# Patient Record
Sex: FEMALE | Race: WHITE | Employment: FULL TIME | URBAN - METROPOLITAN AREA
[De-identification: names, ages, dates, MRNs, and addresses within clinical notes are randomized per-mention and may not be internally consistent; named-entity substitution may affect disease eponyms.]

---

## 2017-01-10 RX ORDER — MONTELUKAST SODIUM 10 MG/1
TABLET ORAL
Qty: 90 TABLET | Refills: 0 | Status: SHIPPED | OUTPATIENT
Start: 2017-01-10 | End: 2017-04-03

## 2017-01-16 ENCOUNTER — TELEPHONE (OUTPATIENT)
Dept: FAMILY MEDICINE CLINIC | Facility: CLINIC | Age: 52
End: 2017-01-16

## 2017-01-16 DIAGNOSIS — E89.0 POSTABLATIVE HYPOTHYROIDISM: ICD-10-CM

## 2017-01-16 DIAGNOSIS — Z13.0 SCREENING FOR IRON DEFICIENCY ANEMIA: ICD-10-CM

## 2017-01-16 DIAGNOSIS — Z12.31 ENCOUNTER FOR SCREENING MAMMOGRAM FOR MALIGNANT NEOPLASM OF BREAST: ICD-10-CM

## 2017-01-16 DIAGNOSIS — Z13.220 SCREENING FOR LIPOID DISORDERS: ICD-10-CM

## 2017-01-16 DIAGNOSIS — Z13.29 SCREENING FOR ENDOCRINE, NUTRITIONAL, METABOLIC AND IMMUNITY DISORDER: ICD-10-CM

## 2017-01-16 DIAGNOSIS — Z13.21 ENCOUNTER FOR VITAMIN DEFICIENCY SCREENING: ICD-10-CM

## 2017-01-16 DIAGNOSIS — Z13.228 SCREENING FOR ENDOCRINE, NUTRITIONAL, METABOLIC AND IMMUNITY DISORDER: ICD-10-CM

## 2017-01-16 DIAGNOSIS — Z00.00 ROUTINE GENERAL MEDICAL EXAMINATION AT A HEALTH CARE FACILITY: Primary | ICD-10-CM

## 2017-01-16 DIAGNOSIS — Z13.0 SCREENING FOR ENDOCRINE, NUTRITIONAL, METABOLIC AND IMMUNITY DISORDER: ICD-10-CM

## 2017-01-16 DIAGNOSIS — Z13.21 SCREENING FOR ENDOCRINE, NUTRITIONAL, METABOLIC AND IMMUNITY DISORDER: ICD-10-CM

## 2017-01-16 NOTE — TELEPHONE ENCOUNTER
Called patient and spoke with her. Patient is requesting an order for all of her annual blood work prior to her appointment. Patient is also requesting a mammogram order. Patient would like to complete mammogram at [facility].   Advised patient she can

## 2017-01-31 ENCOUNTER — HOSPITAL ENCOUNTER (OUTPATIENT)
Dept: MAMMOGRAPHY | Age: 52
Discharge: HOME | End: 2017-01-31
Attending: FAMILY MEDICINE
Payer: COMMERCIAL

## 2017-01-31 ENCOUNTER — LAB ENCOUNTER (OUTPATIENT)
Dept: LAB | Age: 52
End: 2017-01-31
Attending: FAMILY MEDICINE
Payer: COMMERCIAL

## 2017-01-31 DIAGNOSIS — Z13.0 SCREENING FOR ENDOCRINE, NUTRITIONAL, METABOLIC AND IMMUNITY DISORDER: ICD-10-CM

## 2017-01-31 DIAGNOSIS — Z13.0 SCREENING FOR IRON DEFICIENCY ANEMIA: ICD-10-CM

## 2017-01-31 DIAGNOSIS — Z13.228 SCREENING FOR ENDOCRINE, NUTRITIONAL, METABOLIC AND IMMUNITY DISORDER: ICD-10-CM

## 2017-01-31 DIAGNOSIS — E89.0 POSTABLATIVE HYPOTHYROIDISM: ICD-10-CM

## 2017-01-31 DIAGNOSIS — Z13.220 SCREENING FOR LIPOID DISORDERS: ICD-10-CM

## 2017-01-31 DIAGNOSIS — Z12.31 ENCOUNTER FOR SCREENING MAMMOGRAM FOR MALIGNANT NEOPLASM OF BREAST: ICD-10-CM

## 2017-01-31 DIAGNOSIS — Z13.29 SCREENING FOR ENDOCRINE, NUTRITIONAL, METABOLIC AND IMMUNITY DISORDER: ICD-10-CM

## 2017-01-31 DIAGNOSIS — Z00.00 ROUTINE GENERAL MEDICAL EXAMINATION AT A HEALTH CARE FACILITY: ICD-10-CM

## 2017-01-31 DIAGNOSIS — Z13.21 SCREENING FOR ENDOCRINE, NUTRITIONAL, METABOLIC AND IMMUNITY DISORDER: ICD-10-CM

## 2017-01-31 DIAGNOSIS — Z13.21 ENCOUNTER FOR VITAMIN DEFICIENCY SCREENING: ICD-10-CM

## 2017-01-31 LAB
25-HYDROXYVITAMIN D (TOTAL): 38 NG/ML (ref 30–100)
ALBUMIN SERPL-MCNC: 3.8 G/DL (ref 3.5–4.8)
ALP LIVER SERPL-CCNC: 62 U/L (ref 39–100)
ALT SERPL-CCNC: 24 U/L (ref 14–54)
AST SERPL-CCNC: 11 U/L (ref 15–41)
BASOPHILS # BLD AUTO: 0.06 X10(3) UL (ref 0–0.1)
BASOPHILS NFR BLD AUTO: 1 %
BILIRUB SERPL-MCNC: 0.4 MG/DL (ref 0.1–2)
BUN BLD-MCNC: 9 MG/DL (ref 8–20)
CALCIUM BLD-MCNC: 8.4 MG/DL (ref 8.3–10.3)
CHLORIDE: 107 MMOL/L (ref 101–111)
CHOLEST SMN-MCNC: 187 MG/DL (ref ?–200)
CO2: 26 MMOL/L (ref 22–32)
CREAT BLD-MCNC: 0.8 MG/DL (ref 0.55–1.02)
EOSINOPHIL # BLD AUTO: 0.11 X10(3) UL (ref 0–0.3)
EOSINOPHIL NFR BLD AUTO: 1.9 %
ERYTHROCYTE [DISTWIDTH] IN BLOOD BY AUTOMATED COUNT: 11.9 % (ref 11.5–16)
FREE T4: 1.4 NG/DL (ref 0.9–1.8)
GLUCOSE BLD-MCNC: 89 MG/DL (ref 70–99)
HCT VFR BLD AUTO: 38.1 % (ref 34–50)
HDLC SERPL-MCNC: 82 MG/DL (ref 45–?)
HDLC SERPL: 2.28 {RATIO} (ref ?–4.44)
HGB BLD-MCNC: 12.8 G/DL (ref 12–16)
IMMATURE GRANULOCYTE COUNT: 0.01 X10(3) UL (ref 0–1)
IMMATURE GRANULOCYTE RATIO %: 0.2 %
LDLC SERPL CALC-MCNC: 92 MG/DL (ref ?–130)
LYMPHOCYTES # BLD AUTO: 1.44 X10(3) UL (ref 0.9–4)
LYMPHOCYTES NFR BLD AUTO: 24.4 %
M PROTEIN MFR SERPL ELPH: 7.2 G/DL (ref 6.1–8.3)
MCH RBC QN AUTO: 30 PG (ref 27–33.2)
MCHC RBC AUTO-ENTMCNC: 33.6 G/DL (ref 31–37)
MCV RBC AUTO: 89.4 FL (ref 81–100)
MONOCYTES # BLD AUTO: 0.39 X10(3) UL (ref 0.1–0.6)
MONOCYTES NFR BLD AUTO: 6.6 %
NEUTROPHIL ABS PRELIM: 3.9 X10 (3) UL (ref 1.3–6.7)
NEUTROPHILS # BLD AUTO: 3.9 X10(3) UL (ref 1.3–6.7)
NEUTROPHILS NFR BLD AUTO: 65.9 %
NONHDLC SERPL-MCNC: 105 MG/DL (ref ?–130)
PLATELET # BLD AUTO: 191 10(3)UL (ref 150–450)
POTASSIUM SERPL-SCNC: 4.2 MMOL/L (ref 3.6–5.1)
RBC # BLD AUTO: 4.26 X10(6)UL (ref 3.8–5.1)
RED CELL DISTRIBUTION WIDTH-SD: 38.3 FL (ref 35.1–46.3)
SODIUM SERPL-SCNC: 139 MMOL/L (ref 136–144)
TRIGLYCERIDES: 63 MG/DL (ref ?–150)
TSI SER-ACNC: 0.14 MIU/ML (ref 0.35–5.5)
VLDL: 13 MG/DL (ref 5–40)
WBC # BLD AUTO: 5.9 X10(3) UL (ref 4–13)

## 2017-01-31 PROCEDURE — 36415 COLL VENOUS BLD VENIPUNCTURE: CPT

## 2017-01-31 PROCEDURE — 85025 COMPLETE CBC W/AUTO DIFF WBC: CPT

## 2017-01-31 PROCEDURE — 82306 VITAMIN D 25 HYDROXY: CPT

## 2017-01-31 PROCEDURE — 80053 COMPREHEN METABOLIC PANEL: CPT

## 2017-01-31 PROCEDURE — 84443 ASSAY THYROID STIM HORMONE: CPT

## 2017-01-31 PROCEDURE — 77067 SCR MAMMO BI INCL CAD: CPT

## 2017-01-31 PROCEDURE — 80061 LIPID PANEL: CPT

## 2017-01-31 PROCEDURE — 84439 ASSAY OF FREE THYROXINE: CPT

## 2017-02-03 DIAGNOSIS — E89.0 POSTABLATIVE HYPOTHYROIDISM: Primary | ICD-10-CM

## 2017-02-03 DIAGNOSIS — R79.89 LOW VITAMIN D LEVEL: ICD-10-CM

## 2017-02-06 PROBLEM — Z98.84 HISTORY OF ADJUSTABLE GASTRIC BANDING: Status: ACTIVE | Noted: 2017-02-06

## 2017-02-08 ENCOUNTER — OFFICE VISIT (OUTPATIENT)
Dept: FAMILY MEDICINE CLINIC | Facility: CLINIC | Age: 52
End: 2017-02-08

## 2017-02-08 VITALS
HEART RATE: 74 BPM | RESPIRATION RATE: 16 BRPM | SYSTOLIC BLOOD PRESSURE: 100 MMHG | BODY MASS INDEX: 32.44 KG/M2 | DIASTOLIC BLOOD PRESSURE: 70 MMHG | HEIGHT: 64 IN | WEIGHT: 190 LBS

## 2017-02-08 DIAGNOSIS — E55.9 VITAMIN D DEFICIENCY: ICD-10-CM

## 2017-02-08 DIAGNOSIS — Z98.84 HISTORY OF ADJUSTABLE GASTRIC BANDING: ICD-10-CM

## 2017-02-08 DIAGNOSIS — Z00.00 ANNUAL PHYSICAL EXAM: Primary | ICD-10-CM

## 2017-02-08 DIAGNOSIS — J30.89 PERENNIAL ALLERGIC RHINITIS, UNSPECIFIED ALLERGIC RHINITIS TRIGGER: ICD-10-CM

## 2017-02-08 DIAGNOSIS — E89.0 POSTABLATIVE HYPOTHYROIDISM: ICD-10-CM

## 2017-02-08 PROCEDURE — 99396 PREV VISIT EST AGE 40-64: CPT | Performed by: FAMILY MEDICINE

## 2017-02-08 RX ORDER — ERGOCALCIFEROL 1.25 MG/1
50000 CAPSULE ORAL WEEKLY
Qty: 4 CAPSULE | Refills: 2 | Status: SHIPPED | OUTPATIENT
Start: 2017-02-08 | End: 2017-03-10

## 2017-02-08 NOTE — PROGRESS NOTES
Chief Complaint:   Patient presents with:  Physical    HPI:   This is a 54year old female patient's presenting for physical and for follow up after she was diagnosed with grave's and is currently being treated with Endocrinology.     Patient tolerated radi Negative for congestion, ear pain, facial swelling, postnasal drip, rhinorrhea, sinus pressure, sore throat and voice change. Eyes: Negative for photophobia, pain, discharge, redness, itching and visual disturbance.    Respiratory: Negative for cough, ch present  Nose: Nose normal.   Mouth/Throat: Oropharynx is clear and moist.   Eyes: Conjunctivae are normal. Pupils are equal, round, and reactive to light. Right eye exhibits no discharge. Left eye exhibits no discharge. No scleral icterus.    Neck: Normal -will do supplements. Follow up in 6-12 months, sooner as needed. Pap in 2018.

## 2017-02-10 ENCOUNTER — HOSPITAL ENCOUNTER (OUTPATIENT)
Dept: MAMMOGRAPHY | Age: 52
Discharge: HOME | End: 2017-02-10
Attending: FAMILY MEDICINE
Payer: COMMERCIAL

## 2017-02-10 ENCOUNTER — HOSPITAL ENCOUNTER (OUTPATIENT)
Dept: ULTRASOUND IMAGING | Age: 52
Discharge: HOME | End: 2017-02-10
Attending: FAMILY MEDICINE
Payer: COMMERCIAL

## 2017-02-10 DIAGNOSIS — R92.8 ABNORMAL MAMMOGRAM OF RIGHT BREAST: ICD-10-CM

## 2017-02-10 PROCEDURE — 76642 ULTRASOUND BREAST LIMITED: CPT

## 2017-02-10 PROCEDURE — 77065 DX MAMMO INCL CAD UNI: CPT

## 2017-02-10 PROCEDURE — 77061 BREAST TOMOSYNTHESIS UNI: CPT

## 2017-04-04 RX ORDER — MONTELUKAST SODIUM 10 MG/1
TABLET ORAL
Qty: 90 TABLET | Refills: 0 | Status: SHIPPED | OUTPATIENT
Start: 2017-04-04 | End: 2017-07-21

## 2017-07-22 RX ORDER — MONTELUKAST SODIUM 10 MG/1
TABLET ORAL
Qty: 90 TABLET | Refills: 0 | Status: SHIPPED | OUTPATIENT
Start: 2017-07-22 | End: 2017-10-11

## 2017-07-22 NOTE — TELEPHONE ENCOUNTER
LOV: 5/12/16  LAST ORDERED 4/4/17  Please approve or deny, request does not meet protocol.   Thank you

## 2017-10-11 RX ORDER — MONTELUKAST SODIUM 10 MG/1
TABLET ORAL
Qty: 90 TABLET | Refills: 0 | Status: SHIPPED | OUTPATIENT
Start: 2017-10-11 | End: 2018-01-28

## 2017-10-11 NOTE — TELEPHONE ENCOUNTER
LF: 07/22/2017   LOV: 02/08/2017    Pending Prescriptions Disp Refills    MONTELUKAST SODIUM 10 MG Oral Tab [Pharmacy Med Name: MONTELUKAST SOD TABS 10MG] 90 tablet 0     Sig: TAKE 1 TABLET DAILY         Pt Asthma Action Score greater than or equal to 20,

## 2018-01-29 RX ORDER — MONTELUKAST SODIUM 10 MG/1
TABLET ORAL
Qty: 90 TABLET | Refills: 0 | Status: SHIPPED | OUTPATIENT
Start: 2018-01-29 | End: 2018-04-27

## 2018-01-29 NOTE — TELEPHONE ENCOUNTER
Medication(s) to Refill:   Pending Prescriptions Disp Refills    MONTELUKAST SODIUM 10 MG Oral Tab [Pharmacy Med Name: MONTELUKAST SOD TABS 10MG] 90 tablet 0     Sig: TAKE 1 TABLET DAILY           Last Time Medication was Filled:  10/11/17    Last Office V

## 2018-02-28 NOTE — PATIENT INSTRUCTIONS
Vania Villeda, [address] [facility],   [address] [place] [address]     Phone: [phone]   Fax: [phone]     Dr. Ingrid Pimentel office.

## 2018-02-28 NOTE — PROGRESS NOTES
Chief Complaint:   Patient presents with:   Anxiety    HPI:   This is a 50year old female presenting for follow up on anxiety:  She reports that she has been having worsening anxiety and slightly depressed mood has been going through a lot with her dad and 112 MCG Oral Tab Take 1 tablet (112 mcg total) by mouth daily. Disp: 90 tablet Rfl: 1   Multiple Vitamins-Minerals (MULTIVITAL) Oral Tab Take 1 tablet by mouth daily. Disp:  Rfl:    Cholecalciferol (VITAMIN D) 2000 UNITS Oral Tab Take 2,000 mg by mouth.  Broderick Mcqueen 211 lb. Vital signs reviewed. Appears stated age, well groomed. Physical Exam   Nursing note and vitals reviewed. Constitutional: She is oriented to person, place, and time. She appears well-developed and well-nourished.    HENT:   Head: Normocephalic a No si or hi, acute anxiety and down mood, due to father's medical condition. ASSESSMENT AND PLAN:   Diagnoses and all orders for this visit:    1.  Encounter for screening mammogram for malignant neoplasm of breast  -due for mammogram  - CIERA BARNARDE

## 2018-04-27 RX ORDER — MONTELUKAST SODIUM 10 MG/1
TABLET ORAL
Qty: 90 TABLET | Refills: 0 | Status: SHIPPED | OUTPATIENT
Start: 2018-04-27 | End: 2018-07-20

## 2018-04-27 NOTE — TELEPHONE ENCOUNTER
Medication(s) to Refill:   Pending Prescriptions Disp Refills    MONTELUKAST SODIUM 10 MG Oral Tab [Pharmacy Med Name: MONTELUKAST SOD TABS 10MG] 90 tablet 0     Sig: TAKE 1 TABLET DAILY             Reason for Medication Refill being sent to Provider / Zack Vivas

## 2018-06-08 ENCOUNTER — TELEPHONE (OUTPATIENT)
Dept: FAMILY MEDICINE CLINIC | Facility: CLINIC | Age: 53
End: 2018-06-08

## 2018-06-08 NOTE — TELEPHONE ENCOUNTER
Pt is appealing dos 01/31/17 for labs. And it is the Vitamin D test. Pt states she was on medications for vitamin defiency. [employer] states dx doesn't not support this. Pls call Millicent at [facility].

## 2018-06-20 DIAGNOSIS — F41.1 GAD (GENERALIZED ANXIETY DISORDER): ICD-10-CM

## 2018-06-20 RX ORDER — ESCITALOPRAM OXALATE 10 MG/1
TABLET ORAL
Qty: 30 TABLET | Refills: 2 | Status: SHIPPED | OUTPATIENT
Start: 2018-06-20 | End: 2018-09-17

## 2018-07-20 RX ORDER — MONTELUKAST SODIUM 10 MG/1
TABLET ORAL
Qty: 90 TABLET | Refills: 0 | Status: SHIPPED | OUTPATIENT
Start: 2018-07-20 | End: 2018-09-27

## 2018-09-02 ENCOUNTER — MED REC SCAN ONLY (OUTPATIENT)
Dept: FAMILY MEDICINE CLINIC | Facility: CLINIC | Age: 53
End: 2018-09-02

## 2018-09-17 DIAGNOSIS — F41.1 GAD (GENERALIZED ANXIETY DISORDER): ICD-10-CM

## 2018-09-17 RX ORDER — ESCITALOPRAM OXALATE 10 MG/1
TABLET ORAL
Qty: 30 TABLET | Refills: 1 | Status: SHIPPED | OUTPATIENT
Start: 2018-09-17 | End: 2018-10-29

## 2018-09-27 RX ORDER — MONTELUKAST SODIUM 10 MG/1
TABLET ORAL
Qty: 90 TABLET | Refills: 0 | Status: SHIPPED | OUTPATIENT
Start: 2018-09-27 | End: 2019-03-08

## 2018-09-27 NOTE — TELEPHONE ENCOUNTER
Medication(s) to Refill:   Requested Prescriptions     Pending Prescriptions Disp Refills   • MONTELUKAST SODIUM 10 MG Oral Tab [Pharmacy Med Name: MONTELUKAST SOD TABS 10MG] 90 tablet 0     Sig: TAKE 1 TABLET DAILY         Reason for Medication Refill rima

## 2018-10-29 DIAGNOSIS — F41.1 GAD (GENERALIZED ANXIETY DISORDER): ICD-10-CM

## 2018-10-30 RX ORDER — ESCITALOPRAM OXALATE 10 MG/1
10 TABLET ORAL
Qty: 30 TABLET | Refills: 1 | Status: SHIPPED | OUTPATIENT
Start: 2018-10-30 | End: 2018-10-31

## 2018-10-30 NOTE — TELEPHONE ENCOUNTER
Medication(s) to Refill:   Requested Prescriptions     Pending Prescriptions Disp Refills   • escitalopram 10 MG Oral Tab 30 tablet 1     Sig: Take 1 tablet (10 mg total) by mouth once daily.          Reason for Medication Refill being sent to Provider / Re

## 2018-10-31 DIAGNOSIS — F41.1 GAD (GENERALIZED ANXIETY DISORDER): ICD-10-CM

## 2018-10-31 RX ORDER — ESCITALOPRAM OXALATE 10 MG/1
10 TABLET ORAL
Qty: 90 TABLET | Refills: 0 | Status: SHIPPED | OUTPATIENT
Start: 2018-10-31 | End: 2019-03-08

## 2018-10-31 RX ORDER — ESCITALOPRAM OXALATE 10 MG/1
10 TABLET ORAL
Qty: 30 TABLET | Refills: 1 | OUTPATIENT
Start: 2018-10-31

## 2018-10-31 NOTE — TELEPHONE ENCOUNTER
LOV: 2/28/18  It was filled yesterday but for only 1 month with 1 refill. Patient must get a 3 month supply from mail order. Please approve or deny pending Rx. Thank you!

## 2018-10-31 NOTE — TELEPHONE ENCOUNTER
Pt is requesting a 90 day supply of lexapro not 30 day sent to mail order. Pls call pt at [phone].

## 2018-11-15 ENCOUNTER — OFFICE VISIT (OUTPATIENT)
Dept: ENDOCRINOLOGY CLINIC | Facility: CLINIC | Age: 53
End: 2018-11-15
Payer: COMMERCIAL

## 2018-11-15 VITALS
HEART RATE: 76 BPM | DIASTOLIC BLOOD PRESSURE: 72 MMHG | SYSTOLIC BLOOD PRESSURE: 122 MMHG | HEIGHT: 63 IN | BODY MASS INDEX: 36.86 KG/M2 | WEIGHT: 208 LBS

## 2018-11-15 DIAGNOSIS — E89.0 POSTABLATIVE HYPOTHYROIDISM: Primary | ICD-10-CM

## 2018-11-15 PROCEDURE — 99212 OFFICE O/P EST SF 10 MIN: CPT | Performed by: INTERNAL MEDICINE

## 2018-11-15 PROCEDURE — 99203 OFFICE O/P NEW LOW 30 MIN: CPT | Performed by: INTERNAL MEDICINE

## 2018-11-15 NOTE — PROGRESS NOTES
Name: Jaci Carmona  Date: 11/15/2018    Referring Physician: No ref.  provider found    Patient presents with:  Consult: Pt would like to see new Endocrinologist.      Cristino Pina is a 50year old female who presents fo 1 tablet by mouth daily. , Disp: , Rfl:   •  Cholecalciferol (VITAMIN D) 2000 UNITS Oral Tab, Take 2,000 mg by mouth., Disp: , Rfl:      Allergies:     Bermuda Grass           OTHER (SEE COMMENTS)    Comment:Rhinitis  Sulfites                    Comment:Nydia rhythm, , no murmurs, S3 or S4  Gastrointestinal:  normal bowel sounds  Musculoskeletal:  normal muscle strength and tone  PV: normal pulses of carotids, pedals  Skin:  normal moisture and skin texture  Hair & Nails:  normal scalp hair     Neuro:  sensory

## 2019-02-28 ENCOUNTER — PATIENT OUTREACH (OUTPATIENT)
Dept: FAMILY MEDICINE CLINIC | Facility: CLINIC | Age: 54
End: 2019-02-28

## 2019-02-28 DIAGNOSIS — Z12.39 SCREENING FOR BREAST CANCER: Primary | ICD-10-CM

## 2019-03-08 DIAGNOSIS — F41.1 GAD (GENERALIZED ANXIETY DISORDER): ICD-10-CM

## 2019-03-08 RX ORDER — MONTELUKAST SODIUM 10 MG/1
TABLET ORAL
Qty: 90 TABLET | Refills: 0 | Status: SHIPPED | OUTPATIENT
Start: 2019-03-08 | End: 2019-06-04

## 2019-03-08 RX ORDER — ESCITALOPRAM OXALATE 10 MG/1
TABLET ORAL
Qty: 90 TABLET | Refills: 0 | Status: SHIPPED | OUTPATIENT
Start: 2019-03-08 | End: 2019-06-04

## 2019-03-08 NOTE — TELEPHONE ENCOUNTER
Medication(s) to Refill:   Requested Prescriptions     Pending Prescriptions Disp Refills   • MONTELUKAST SODIUM 10 MG Oral Tab [Pharmacy Med Name: MONTELUKAST SOD TABS 10MG] 90 tablet 0     Sig: TAKE 1 TABLET DAILY (NEED OFFICE VISIT AND LABS FOR FURTHER

## 2019-03-08 NOTE — TELEPHONE ENCOUNTER
Medication(s) to Refill:   Requested Prescriptions     Pending Prescriptions Disp Refills   • ESCITALOPRAM 10 MG Oral Tab [Pharmacy Med Name: ESCITALOPRAM TABS 10MG] 90 tablet 0     Sig: TAKE 1 TABLET DAILY         Reason for Medication Refill being sent t

## 2019-04-23 ENCOUNTER — HOSPITAL ENCOUNTER (OUTPATIENT)
Dept: MAMMOGRAPHY | Age: 54
Discharge: HOME | End: 2019-04-23
Attending: FAMILY MEDICINE
Payer: COMMERCIAL

## 2019-04-23 DIAGNOSIS — Z12.39 SCREENING FOR BREAST CANCER: ICD-10-CM

## 2019-04-23 PROCEDURE — 77063 BREAST TOMOSYNTHESIS BI: CPT | Performed by: FAMILY MEDICINE

## 2019-04-23 PROCEDURE — 77067 SCR MAMMO BI INCL CAD: CPT | Performed by: FAMILY MEDICINE

## 2019-05-06 ENCOUNTER — OFFICE VISIT (OUTPATIENT)
Dept: FAMILY MEDICINE CLINIC | Facility: CLINIC | Age: 54
End: 2019-05-06
Payer: COMMERCIAL

## 2019-05-06 VITALS
HEIGHT: 63 IN | SYSTOLIC BLOOD PRESSURE: 118 MMHG | TEMPERATURE: 98 F | DIASTOLIC BLOOD PRESSURE: 76 MMHG | BODY MASS INDEX: 38.8 KG/M2 | HEART RATE: 70 BPM | WEIGHT: 219 LBS | RESPIRATION RATE: 16 BRPM

## 2019-05-06 DIAGNOSIS — Z13.0 SCREENING FOR ENDOCRINE, NUTRITIONAL, METABOLIC AND IMMUNITY DISORDER: ICD-10-CM

## 2019-05-06 DIAGNOSIS — Z12.4 SCREENING FOR MALIGNANT NEOPLASM OF CERVIX: ICD-10-CM

## 2019-05-06 DIAGNOSIS — Z13.29 SCREENING FOR ENDOCRINE, NUTRITIONAL, METABOLIC AND IMMUNITY DISORDER: ICD-10-CM

## 2019-05-06 DIAGNOSIS — Z12.11 SCREEN FOR COLON CANCER: ICD-10-CM

## 2019-05-06 DIAGNOSIS — Z00.00 ANNUAL PHYSICAL EXAM: Primary | ICD-10-CM

## 2019-05-06 DIAGNOSIS — Z13.228 SCREENING FOR ENDOCRINE, NUTRITIONAL, METABOLIC AND IMMUNITY DISORDER: ICD-10-CM

## 2019-05-06 DIAGNOSIS — Z13.21 SCREENING FOR ENDOCRINE, NUTRITIONAL, METABOLIC AND IMMUNITY DISORDER: ICD-10-CM

## 2019-05-06 PROCEDURE — 87624 HPV HI-RISK TYP POOLED RSLT: CPT | Performed by: FAMILY MEDICINE

## 2019-05-06 PROCEDURE — 99396 PREV VISIT EST AGE 40-64: CPT | Performed by: FAMILY MEDICINE

## 2019-05-06 PROCEDURE — 88175 CYTOPATH C/V AUTO FLUID REDO: CPT | Performed by: FAMILY MEDICINE

## 2019-05-06 NOTE — PROGRESS NOTES
Chief Complaint:   Patient presents with:  Physical    HPI:   This is a 48year old female presenting for annual physical she is due for Pap today history of HPV she is due for colonoscopy will have it this summer she is due for lab work which will be this mouth daily. Disp: 90 tablet Rfl: 2   Coenzyme Q10 (COQ-10) 200 MG Oral Cap  Disp:  Rfl:    Multiple Vitamins-Minerals (MULTIVITAL) Oral Tab Take 1 tablet by mouth daily.  Disp:  Rfl:    Cholecalciferol (VITAMIN D) 2000 UNITS Oral Tab Take 2,000 mg by mouth Vital signs reviewed. Appears stated age, well groomed. Physical Exam   Nursing note and vitals reviewed. Constitutional: She is oriented to person, place, and time. She appears well-developed and well-nourished.    HENT:   Head: Normocephalic and atrau alopecia  Psychiatric: She has a normal mood and affect. Her behavior is normal. Judgment and thought content normal.        ASSESSMENT AND PLAN:   Diagnoses and all orders for this visit:    1. Annual physical exam      2.  Screening for endocrine, nutriti

## 2019-05-09 ENCOUNTER — HOSPITAL ENCOUNTER (OUTPATIENT)
Dept: MAMMOGRAPHY | Age: 54
Discharge: HOME | End: 2019-05-09
Attending: FAMILY MEDICINE
Payer: COMMERCIAL

## 2019-05-09 ENCOUNTER — HOSPITAL ENCOUNTER (OUTPATIENT)
Dept: ULTRASOUND IMAGING | Age: 54
Discharge: HOME | End: 2019-05-09
Attending: FAMILY MEDICINE
Payer: COMMERCIAL

## 2019-05-09 DIAGNOSIS — R92.2 INCONCLUSIVE MAMMOGRAM: ICD-10-CM

## 2019-05-09 PROCEDURE — 76641 ULTRASOUND BREAST COMPLETE: CPT | Performed by: FAMILY MEDICINE

## 2019-05-09 PROCEDURE — 77061 BREAST TOMOSYNTHESIS UNI: CPT | Performed by: FAMILY MEDICINE

## 2019-05-09 PROCEDURE — 77065 DX MAMMO INCL CAD UNI: CPT | Performed by: FAMILY MEDICINE

## 2019-05-09 NOTE — IMAGING NOTE
This Breast Care RN assisted Dr. Sterling Singh with recommendation for a right 2 site ultrasound guided biopsy. Procedure reviewed and all questions answered. Emotional and educational support given.    On the day of the biopsy, pt instructed to take Tylenol 1000

## 2019-05-10 ENCOUNTER — TELEPHONE (OUTPATIENT)
Dept: FAMILY MEDICINE CLINIC | Facility: CLINIC | Age: 54
End: 2019-05-10

## 2019-05-11 ENCOUNTER — PATIENT MESSAGE (OUTPATIENT)
Dept: FAMILY MEDICINE CLINIC | Facility: CLINIC | Age: 54
End: 2019-05-11

## 2019-05-11 DIAGNOSIS — N63.0 BREAST NODULE: ICD-10-CM

## 2019-05-11 DIAGNOSIS — R92.8 ABNORMAL MAMMOGRAM: Primary | ICD-10-CM

## 2019-05-13 ENCOUNTER — PATIENT MESSAGE (OUTPATIENT)
Dept: FAMILY MEDICINE CLINIC | Facility: CLINIC | Age: 54
End: 2019-05-13

## 2019-05-13 DIAGNOSIS — R79.89 ABNORMAL CBC: ICD-10-CM

## 2019-05-13 DIAGNOSIS — E78.00 ELEVATED CHOLESTEROL: Primary | ICD-10-CM

## 2019-05-13 NOTE — TELEPHONE ENCOUNTER
From: Jennifer Wiseman  To: Maura Pickett MD  Sent: 5/11/2019 12:04 PM CDT  Subject: Other    Dr. Shlipa Gary (radiologist) was to have sent recommendation for breast biopsy so Dr Poli Patel can put order into system for scheduling to occur.  Scheduling says they d

## 2019-05-14 ENCOUNTER — PATIENT MESSAGE (OUTPATIENT)
Dept: FAMILY MEDICINE CLINIC | Facility: CLINIC | Age: 54
End: 2019-05-14

## 2019-05-14 NOTE — TELEPHONE ENCOUNTER
From: Tiffanie Langston  To: Sindy Burns MD  Sent: 5/13/2019 12:32 PM CDT  Subject: Test Results Question    Dr. Musa Tate concerned about the significant changes of lipid panel. I know I need watch my diet.  When is a decision made for prescription

## 2019-05-14 NOTE — TELEPHONE ENCOUNTER
Patient is looking for 5/9/19 lab results. Patient is concerned about her cholesterol levels and her absolute neutrophils. Please review and advise. Thank you!

## 2019-05-14 NOTE — TELEPHONE ENCOUNTER
1. Please let patient know normal pap and negative HPV. Repeat in 3 years. 2. Cholesterol is borderline. She doesn't need medication now- we will start with lifestyle modifications. She should follow a low fat, low carbohydrate diet.  Increase intake of f

## 2019-05-15 RX ORDER — DIAZEPAM 5 MG/1
TABLET ORAL
Qty: 1 TABLET | Refills: 0 | Status: SHIPPED
Start: 2019-05-15 | End: 2019-06-27 | Stop reason: ALTCHOICE

## 2019-05-15 NOTE — TELEPHONE ENCOUNTER
Patient is scheduled for a breast biopsy on 5/22/19. Patient is requesting Valium to take before her biopsy. Please advise. Thank you!

## 2019-05-15 NOTE — TELEPHONE ENCOUNTER
From: Sarah Julien  To:  Teresa Maria MD  Sent: 5/14/2019 6:35 PM CDT  Subject: Prescription Question    My breast bx is scheduled for 5/22 @ [facility]. The scheduling nurses stated I needed to contact my PCP for a prescription for Valium to be t

## 2019-05-15 NOTE — TELEPHONE ENCOUNTER
Rx faxed. She shouldn't take Rx with any other medications that may cause drowsiness. Please make sure she has someone drive her to and from procedure.

## 2019-05-22 ENCOUNTER — HOSPITAL ENCOUNTER (OUTPATIENT)
Dept: MAMMOGRAPHY | Facility: HOSPITAL | Age: 54
Discharge: HOME | End: 2019-05-22
Attending: FAMILY MEDICINE
Payer: COMMERCIAL

## 2019-05-22 DIAGNOSIS — N63.0 BREAST NODULE: ICD-10-CM

## 2019-05-22 DIAGNOSIS — R92.8 ABNORMAL MAMMOGRAM: ICD-10-CM

## 2019-05-22 PROCEDURE — 88360 TUMOR IMMUNOHISTOCHEM/MANUAL: CPT | Performed by: FAMILY MEDICINE

## 2019-05-22 PROCEDURE — 88341 IMHCHEM/IMCYTCHM EA ADD ANTB: CPT | Performed by: FAMILY MEDICINE

## 2019-05-22 PROCEDURE — 19084 BX BREAST ADD LESION US IMAG: CPT | Performed by: FAMILY MEDICINE

## 2019-05-22 PROCEDURE — 77065 DX MAMMO INCL CAD UNI: CPT | Performed by: FAMILY MEDICINE

## 2019-05-22 PROCEDURE — 19083 BX BREAST 1ST LESION US IMAG: CPT | Performed by: FAMILY MEDICINE

## 2019-05-22 PROCEDURE — 88305 TISSUE EXAM BY PATHOLOGIST: CPT | Performed by: FAMILY MEDICINE

## 2019-05-22 PROCEDURE — 88342 IMHCHEM/IMCYTCHM 1ST ANTB: CPT | Performed by: FAMILY MEDICINE

## 2019-05-24 ENCOUNTER — TELEPHONE (OUTPATIENT)
Dept: MAMMOGRAPHY | Facility: HOSPITAL | Age: 54
End: 2019-05-24

## 2019-05-24 ENCOUNTER — TELEPHONE (OUTPATIENT)
Dept: FAMILY MEDICINE CLINIC | Facility: CLINIC | Age: 54
End: 2019-05-24

## 2019-05-24 NOTE — TELEPHONE ENCOUNTER
Telephoned Lauren Izaguirre and name,  verified with patient. Notified Lauren Izaguirre of positive for malignancy  biopsy result. Concordance verified by radiologist, Dr. Kim Adkins. Questions answered. Support given.  Lauren Izaguirre reports biopsy site is

## 2019-05-24 NOTE — TELEPHONE ENCOUNTER
----- Message from Suzette Sears MD sent at 5/24/2019 11:50 AM CDT -----  Please check if patient is going to be contact through radiology about this biopsy or I need to relay results. I am not releasing to [employer].

## 2019-05-24 NOTE — TELEPHONE ENCOUNTER
Pt called regarding biopsy results and was tearful - I did relay that Dr. Eliz Hatch (radiology) said he will contact her to discuss the results more in detail and that he plans to set her up with Dr. Marlene Ocasio. She verbalizes understanding.      Dr. Alex Tate made a

## 2019-05-24 NOTE — TELEPHONE ENCOUNTER
I spoke to Dr. Mariana Arellano (radiology) - he said he will relay the biopsy results to the patient and then have the nurse navigator set her up with an appt with Dr. Lola Adkins. ([facility] Moses - Dr. Tico Maldonado is on maternity leave).

## 2019-05-28 ENCOUNTER — NURSE NAVIGATOR ENCOUNTER (OUTPATIENT)
Dept: HEMATOLOGY/ONCOLOGY | Facility: HOSPITAL | Age: 54
End: 2019-05-28

## 2019-05-28 NOTE — PROGRESS NOTES
Spoke to patient regarding newly diagnosed DCIS. Introduced myself as the breast care navigator and how I work with the physicians who will be involved in her care including surgery, medical oncology and radiation oncology.  We discussed the nature of french

## 2019-05-31 ENCOUNTER — TELEPHONE (OUTPATIENT)
Dept: SURGERY | Facility: CLINIC | Age: 54
End: 2019-05-31

## 2019-06-03 ENCOUNTER — OFFICE VISIT (OUTPATIENT)
Dept: SURGERY | Facility: CLINIC | Age: 54
End: 2019-06-03
Payer: COMMERCIAL

## 2019-06-03 ENCOUNTER — NURSE NAVIGATOR ENCOUNTER (OUTPATIENT)
Dept: HEMATOLOGY/ONCOLOGY | Facility: HOSPITAL | Age: 54
End: 2019-06-03

## 2019-06-03 VITALS
HEIGHT: 63 IN | SYSTOLIC BLOOD PRESSURE: 129 MMHG | WEIGHT: 222.19 LBS | OXYGEN SATURATION: 97 % | DIASTOLIC BLOOD PRESSURE: 80 MMHG | RESPIRATION RATE: 18 BRPM | BODY MASS INDEX: 39.37 KG/M2 | HEART RATE: 69 BPM

## 2019-06-03 DIAGNOSIS — D05.11 DUCTAL CARCINOMA IN SITU (DCIS) OF RIGHT BREAST: Primary | ICD-10-CM

## 2019-06-03 PROCEDURE — 99245 OFF/OP CONSLTJ NEW/EST HI 55: CPT | Performed by: SURGERY

## 2019-06-03 NOTE — H&P (VIEW-ONLY)
[facility]    Patient Name:  Koffi Suazo   YOB: 1965   Gender:  Female   Appt Date:  6/3/2019   Provider:  Marylou Erwin MD   Insurance:  [facility]  Primary Care Provider:Bob Moody Blizzard, Diagnostic Mammogram on 5/9/19: BIRADS 4C. There are 2 foci of asymmetry in the medial right breast which persist with additional views. Ultrasound demonstrates 8 all solid mass 2 o'clock 6 cm from the nipple 6 mm x 5 mm x 6 mm.   Ultrasound-guided core bi Comment:Swollen lips and uvula, sulfa drugs ok.      History:  Reviewed:  Past Medical History:   Diagnosis Date   • Graves disease    • Hyperthyroidism       Reviewed:  Past Surgical History:   Procedure Laterality Date   • Cdh lapband program        Rev Lymph Nodes: Lymph Nodes no cervical LAD, supraclavicular LAD, axillary LAD, or inguinal LAD. Lungs: Auscultation: breath sounds normal.   Breast: Breast are symmetric. There is are no palpable lumps or bumps bilaterally.  No nipple discharge or skin arenas Patient understand her options include mastectomy versus partial mastectomy/lumpectomy. She is a candidate for breast conservation which he opted for. The role for adjuvant radiation and hormonal therapy were also discussed.   Patient understands that fol

## 2019-06-03 NOTE — PATIENT INSTRUCTIONS
Surgery: Right partial mastectomy with wire localization. Date of Surgery:  6/21/19  Hosptial:    [place] [address] [address], [address]  Phone: [phone]    · This is an: Outpatient procedure.   · Use the provided Chlorhexadi him/her will need to see you prior to surgery. · If this is an inpatient surgery, attending the [address] Surgical Oncology Pre-operative Education Class is strongly encouraged. Email [email or web address] to RSVP or for more class information.        Pre-

## 2019-06-03 NOTE — PROGRESS NOTES
Met with patient and her  in clinic. Introduced myself as the breast navigator nurse and explained my role and how I will work with all of the physicians involved in her care.  Explained the role of all of the physicians involved in her care ankur

## 2019-06-03 NOTE — CONSULTS
[facility]    Patient Name:  Opal Foley   YOB: 1965   Gender:  Female   Appt Date:  6/3/2019   Provider:  Pamela Iraheta MD   Insurance:  [facility]  Primary Care Provider:Lionel Toribio, core biopsy is recommended. US breast biopsy x 2 right breast on 19: 2:00 5 cm from the nipple (coil clip), 2:00. 6 cm from the nipple (TriBar clip). Both sites positive for DCIS ER/OK+.     Age of menarche: 17    Age of first birth: 41  LMP:  No     Reviewed:  Family History   Problem Relation Age of Onset   • Hypertension Father    • Lipids Father    • Obesity Father    • Diabetes Father    • Cancer Father         lung   • Other (cad) Father         stents   • Other (copd) Mother         pulmo (no guarding, no rebound), or CVA tenderness and soft and non-distended. Liver: no hepatomegaly. Spleen: no splenomegaly. Hernia: none palpable. Musculoskeletal: Extremities: no edema. Skin: Inspection and palpation: no jaundice.       Document Review: FACS    Chief of Surgical Oncolgy  Department of Surgical Oncology  [address] [facility], [address] [facility]  [address]  [address], [address] [place] [address], [address] [place] [address]  T: [phone]  F: [phone]

## 2019-06-04 ENCOUNTER — TELEPHONE (OUTPATIENT)
Dept: FAMILY MEDICINE CLINIC | Facility: CLINIC | Age: 54
End: 2019-06-04

## 2019-06-04 DIAGNOSIS — F41.1 GAD (GENERALIZED ANXIETY DISORDER): ICD-10-CM

## 2019-06-04 DIAGNOSIS — D05.11 DUCTAL CARCINOMA IN SITU (DCIS) OF RIGHT BREAST: Primary | ICD-10-CM

## 2019-06-04 RX ORDER — ESCITALOPRAM OXALATE 10 MG/1
TABLET ORAL
Qty: 90 TABLET | Refills: 0 | Status: SHIPPED | OUTPATIENT
Start: 2019-06-04 | End: 2019-08-19

## 2019-06-04 RX ORDER — MONTELUKAST SODIUM 10 MG/1
TABLET ORAL
Qty: 90 TABLET | Refills: 0 | Status: SHIPPED | OUTPATIENT
Start: 2019-06-04 | End: 2019-08-19

## 2019-06-04 NOTE — TELEPHONE ENCOUNTER
Patient spouse Linda Neal called and requested to speak to supervisor regarding release of critical breast imaging results to patient.   Unhappy that patient learned of breast cancer via mychart and no phone call from provider office explaining diagnosis, he bel

## 2019-06-06 ENCOUNTER — PATIENT MESSAGE (OUTPATIENT)
Dept: ENDOCRINOLOGY CLINIC | Facility: CLINIC | Age: 54
End: 2019-06-06

## 2019-06-06 DIAGNOSIS — E89.0 POSTABLATIVE HYPOTHYROIDISM: ICD-10-CM

## 2019-06-06 RX ORDER — DIAZEPAM 5 MG/1
5 TABLET ORAL AS NEEDED
Status: CANCELLED | OUTPATIENT
Start: 2019-06-06

## 2019-06-06 RX ORDER — LEVOTHYROXINE SODIUM 112 UG/1
112 TABLET ORAL DAILY
Qty: 90 TABLET | Refills: 2 | Status: SHIPPED | OUTPATIENT
Start: 2019-06-06 | End: 2020-01-06

## 2019-06-06 NOTE — TELEPHONE ENCOUNTER
From: Lauren Izaguirre  To: Christina Whitney MD  Sent: 6/6/2019 8:22 AM CDT  Subject: Referral Request    Requesting refill of Levothyroxine 112mcg 90 day supply be sent to [facility] (mail order).

## 2019-06-20 ENCOUNTER — ANESTHESIA EVENT (OUTPATIENT)
Dept: SURGERY | Facility: HOSPITAL | Age: 54
End: 2019-06-20
Payer: COMMERCIAL

## 2019-06-20 ENCOUNTER — TELEPHONE (OUTPATIENT)
Dept: MAMMOGRAPHY | Age: 54
End: 2019-06-20

## 2019-06-20 NOTE — TELEPHONE ENCOUNTER
Spoke with pt and provided pre-procedure instructions for needle LOC scheduled for tomorrow prior to surgery.  I explained that pt will travel through the lobby to the [facility] to have LOC preformed by the Radiologist. Pt verbalized understanding, answered quest

## 2019-06-21 ENCOUNTER — HOSPITAL ENCOUNTER (OUTPATIENT)
Dept: MAMMOGRAPHY | Facility: HOSPITAL | Age: 54
Discharge: HOME | End: 2019-06-21
Attending: SURGERY
Payer: COMMERCIAL

## 2019-06-21 ENCOUNTER — HOSPITAL ENCOUNTER (OUTPATIENT)
Facility: HOSPITAL | Age: 54
Setting detail: HOSPITAL OUTPATIENT SURGERY
Discharge: HOME | End: 2019-06-21
Attending: SURGERY | Admitting: SURGERY
Payer: COMMERCIAL

## 2019-06-21 ENCOUNTER — ANESTHESIA (OUTPATIENT)
Dept: SURGERY | Facility: HOSPITAL | Age: 54
End: 2019-06-21
Payer: COMMERCIAL

## 2019-06-21 VITALS
RESPIRATION RATE: 18 BRPM | SYSTOLIC BLOOD PRESSURE: 107 MMHG | BODY MASS INDEX: 39.46 KG/M2 | HEART RATE: 75 BPM | TEMPERATURE: 98 F | DIASTOLIC BLOOD PRESSURE: 73 MMHG | HEIGHT: 63 IN | WEIGHT: 222.69 LBS | OXYGEN SATURATION: 96 %

## 2019-06-21 DIAGNOSIS — D05.11 DUCTAL CARCINOMA IN SITU (DCIS) OF RIGHT BREAST: ICD-10-CM

## 2019-06-21 PROCEDURE — 88307 TISSUE EXAM BY PATHOLOGIST: CPT | Performed by: SURGERY

## 2019-06-21 PROCEDURE — 76098 X-RAY EXAM SURGICAL SPECIMEN: CPT | Performed by: SURGERY

## 2019-06-21 PROCEDURE — 88341 IMHCHEM/IMCYTCHM EA ADD ANTB: CPT | Performed by: SURGERY

## 2019-06-21 PROCEDURE — 88305 TISSUE EXAM BY PATHOLOGIST: CPT | Performed by: SURGERY

## 2019-06-21 PROCEDURE — 0HBT0ZZ EXCISION OF RIGHT BREAST, OPEN APPROACH: ICD-10-PCS | Performed by: SURGERY

## 2019-06-21 PROCEDURE — 81025 URINE PREGNANCY TEST: CPT | Performed by: SURGERY

## 2019-06-21 PROCEDURE — 88342 IMHCHEM/IMCYTCHM 1ST ANTB: CPT | Performed by: SURGERY

## 2019-06-21 PROCEDURE — 88344 IMHCHEM/IMCYTCHM EA MLT ANTB: CPT | Performed by: SURGERY

## 2019-06-21 PROCEDURE — 19282 PERQ DEVICE BREAST EA IMAG: CPT | Performed by: SURGERY

## 2019-06-21 PROCEDURE — 19281 PERQ DEVICE BREAST 1ST IMAG: CPT | Performed by: SURGERY

## 2019-06-21 RX ORDER — LABETALOL HYDROCHLORIDE 5 MG/ML
5 INJECTION, SOLUTION INTRAVENOUS EVERY 5 MIN PRN
Status: DISCONTINUED | OUTPATIENT
Start: 2019-06-21 | End: 2019-06-21

## 2019-06-21 RX ORDER — NALOXONE HYDROCHLORIDE 0.4 MG/ML
80 INJECTION, SOLUTION INTRAMUSCULAR; INTRAVENOUS; SUBCUTANEOUS AS NEEDED
Status: DISCONTINUED | OUTPATIENT
Start: 2019-06-21 | End: 2019-06-21

## 2019-06-21 RX ORDER — ONDANSETRON 2 MG/ML
4 INJECTION INTRAMUSCULAR; INTRAVENOUS AS NEEDED
Status: DISCONTINUED | OUTPATIENT
Start: 2019-06-21 | End: 2019-06-21

## 2019-06-21 RX ORDER — SODIUM CHLORIDE, SODIUM LACTATE, POTASSIUM CHLORIDE, CALCIUM CHLORIDE 600; 310; 30; 20 MG/100ML; MG/100ML; MG/100ML; MG/100ML
INJECTION, SOLUTION INTRAVENOUS CONTINUOUS
Status: DISCONTINUED | OUTPATIENT
Start: 2019-06-21 | End: 2019-06-21

## 2019-06-21 RX ORDER — MIDAZOLAM HYDROCHLORIDE 1 MG/ML
1 INJECTION INTRAMUSCULAR; INTRAVENOUS EVERY 5 MIN PRN
Status: DISCONTINUED | OUTPATIENT
Start: 2019-06-21 | End: 2019-06-21

## 2019-06-21 RX ORDER — BUPIVACAINE HYDROCHLORIDE 2.5 MG/ML
INJECTION, SOLUTION INFILTRATION; PERINEURAL AS NEEDED
Status: DISCONTINUED | OUTPATIENT
Start: 2019-06-21 | End: 2019-06-21 | Stop reason: HOSPADM

## 2019-06-21 RX ORDER — HYDROCODONE BITARTRATE AND ACETAMINOPHEN 5; 325 MG/1; MG/1
2 TABLET ORAL AS NEEDED
Status: DISCONTINUED | OUTPATIENT
Start: 2019-06-21 | End: 2019-06-21

## 2019-06-21 RX ORDER — ACETAMINOPHEN 500 MG
1000 TABLET ORAL ONCE AS NEEDED
Status: DISCONTINUED | OUTPATIENT
Start: 2019-06-21 | End: 2019-06-21

## 2019-06-21 RX ORDER — MEPERIDINE HYDROCHLORIDE 25 MG/ML
12.5 INJECTION INTRAMUSCULAR; INTRAVENOUS; SUBCUTANEOUS AS NEEDED
Status: DISCONTINUED | OUTPATIENT
Start: 2019-06-21 | End: 2019-06-21

## 2019-06-21 RX ORDER — ACETAMINOPHEN 500 MG
1000 TABLET ORAL ONCE
Status: DISCONTINUED | OUTPATIENT
Start: 2019-06-21 | End: 2019-06-21 | Stop reason: HOSPADM

## 2019-06-21 RX ORDER — DIAZEPAM 5 MG/1
5 TABLET ORAL AS NEEDED
Status: DISCONTINUED | OUTPATIENT
Start: 2019-06-21 | End: 2019-06-21 | Stop reason: HOSPADM

## 2019-06-21 RX ORDER — TRAMADOL HYDROCHLORIDE 50 MG/1
50 TABLET ORAL EVERY 6 HOURS PRN
Qty: 12 TABLET | Refills: 0 | Status: SHIPPED | OUTPATIENT
Start: 2019-06-21 | End: 2019-06-27 | Stop reason: ALTCHOICE

## 2019-06-21 RX ORDER — METOCLOPRAMIDE HYDROCHLORIDE 5 MG/ML
10 INJECTION INTRAMUSCULAR; INTRAVENOUS AS NEEDED
Status: DISCONTINUED | OUTPATIENT
Start: 2019-06-21 | End: 2019-06-21

## 2019-06-21 RX ORDER — ACETAMINOPHEN 500 MG
1000 TABLET ORAL EVERY 6 HOURS PRN
COMMUNITY
End: 2021-09-14

## 2019-06-21 RX ORDER — HYDROCODONE BITARTRATE AND ACETAMINOPHEN 5; 325 MG/1; MG/1
1 TABLET ORAL AS NEEDED
Status: DISCONTINUED | OUTPATIENT
Start: 2019-06-21 | End: 2019-06-21

## 2019-06-21 RX ORDER — CEFAZOLIN SODIUM/WATER 2 G/20 ML
SYRINGE (ML) INTRAVENOUS
Status: DISCONTINUED
Start: 2019-06-21 | End: 2019-06-21

## 2019-06-21 RX ORDER — HYDROMORPHONE HYDROCHLORIDE 1 MG/ML
0.4 INJECTION, SOLUTION INTRAMUSCULAR; INTRAVENOUS; SUBCUTANEOUS EVERY 5 MIN PRN
Status: DISCONTINUED | OUTPATIENT
Start: 2019-06-21 | End: 2019-06-21

## 2019-06-21 RX ORDER — CEFAZOLIN SODIUM/WATER 2 G/20 ML
2 SYRINGE (ML) INTRAVENOUS ONCE
Status: COMPLETED | OUTPATIENT
Start: 2019-06-21 | End: 2019-06-21

## 2019-06-21 NOTE — IMAGING NOTE
Assisted Dr. Alba Alvarado with needle localization for breast for lumpectomy. Procedure explained and all questions answered. Pt verbalized understanding. Emotional support provided and pt tolerated procedure well with minimal discomfort.  Gauze dressing placed a

## 2019-06-21 NOTE — BRIEF OP NOTE
Pre-Operative Diagnosis: Ductal carcinoma in situ (DCIS) of right breast [D05.11]     Post-Operative Diagnosis: Ductal carcinoma in situ (DCIS) of right breast [D05.11]      Procedure Performed:   Right partial mastectomy with wire localization and specime

## 2019-06-21 NOTE — INTERVAL H&P NOTE
There has been no significant change since I saw patient as documented in [facility]. Surgery revisted. To proceed as planned. Wilson Coronel MD FACS

## 2019-06-21 NOTE — ANESTHESIA POSTPROCEDURE EVALUATION
[facility] Patient Status:  Hospital Outpatient Surgery   Age/Gender 48year old female MRN [id number]   [facility] SURGERY Attending Sarah Aragon MD   Hosp Day # 0 PCP Xuan Oquendo MD       Anesthesia Post-op Note

## 2019-06-21 NOTE — ANESTHESIA PREPROCEDURE EVALUATION
PRE-OP EVALUATION    Patient Name: Solomon Young    Pre-op Diagnosis: Ductal carcinoma in situ (DCIS) of right breast [D05.11]    Procedure(s):  Right partial mastectomy with wire localization     Surgeon(s) and Role:     Jo Castañeda MD - Primary adjustable gastric banding  Ductal carcinoma in situ (DCIS) of right breast           Past Surgical History:   Procedure Laterality Date   • [facility] LAPBAND PROGRAM     • SPINE SURGERY PROCEDURE UNLISTED  [id number]     Social History    Tobacco Use      Smokin

## 2019-06-22 NOTE — OPERATIVE REPORT
[facility]    PATIENT'S NAME: Debbie Curiel   ATTENDING PHYSICIAN: Carole Calloway. Sina Green MD   OPERATING PHYSICIAN: Carole Calloway.  Sina Green MD   PATIENT ACCOUNT#:   [id number]    LOCATION:  [address] 10  MEDICAL RECORD #:   [id number]       D tolerated the procedure well without immediate complications. She was extubated in the operating room and was sent in stable condition to the recovery room. Counts were correct. I was present throughout the procedure. Dictated By Aleyda Jane MD

## 2019-06-25 ENCOUNTER — TELEPHONE (OUTPATIENT)
Dept: SURGERY | Facility: CLINIC | Age: 54
End: 2019-06-25

## 2019-06-25 NOTE — TELEPHONE ENCOUNTER
Called patient with [facility].   Will further discuss at out tumor board next week  She is seeing us this week for post op

## 2019-06-25 NOTE — TELEPHONE ENCOUNTER
[facility] for patient to follow up post discharge. Confirmed post op appointment 6/27/19 at 10:30am. Request callback to discuss post recovery.  Patient returned call and reports some significant bruising of her breast as well as a blistered area as a result of

## 2019-06-27 ENCOUNTER — OFFICE VISIT (OUTPATIENT)
Dept: SURGERY | Facility: CLINIC | Age: 54
End: 2019-06-27
Payer: COMMERCIAL

## 2019-06-27 ENCOUNTER — NURSE NAVIGATOR ENCOUNTER (OUTPATIENT)
Dept: HEMATOLOGY/ONCOLOGY | Facility: HOSPITAL | Age: 54
End: 2019-06-27

## 2019-06-27 VITALS
DIASTOLIC BLOOD PRESSURE: 77 MMHG | RESPIRATION RATE: 16 BRPM | TEMPERATURE: 98 F | SYSTOLIC BLOOD PRESSURE: 124 MMHG | BODY MASS INDEX: 39 KG/M2 | HEART RATE: 72 BPM | WEIGHT: 221 LBS | OXYGEN SATURATION: 98 %

## 2019-06-27 DIAGNOSIS — D05.11 DUCTAL CARCINOMA IN SITU (DCIS) OF RIGHT BREAST: Primary | ICD-10-CM

## 2019-06-27 PROCEDURE — 99024 POSTOP FOLLOW-UP VISIT: CPT | Performed by: PHYSICIAN ASSISTANT

## 2019-06-27 NOTE — PROGRESS NOTES
Met with patient following appointment with Gena Montgomery. Assisted patient in scheduling with Dr. Licha Haq and with Dr. Pilo Cohen. We will discuss her case in multidisciplinary conference on Tuesday to discuss the status of the margins.  We will phone with clementine

## 2019-06-27 NOTE — PROGRESS NOTES
[facility]    Patient Name:  Cesario Metz   YOB: 1965   Gender:  Female   Appt Date:  6/27/2019   Provider:  YESIKA Quiroz   Insurance:  [employer]     PATIENT PROVIDERS  Primary Care Provider:Lionel aLurent positive for DCIS ER/CO+. The patient underwent right partial mastectomy with wire localization on 6/21/19. She presents today for post-op appointment.      Vital Signs:  /77 (BP Location: Right arm, Patient Position: Sitting, Cuff Size: adult)   P • Lipids Father    • Obesity Father    • Diabetes Father    • Cancer Father         lung   • Other (cad) Father         stents   • Other (copd) Mother         pulmonary fibrosis   • Stroke Other       Reviewed:  OB History     T0    L0    SAB ductal hyperplasia (ADH). Procedure(s):  None     Assessment / Plan:  (D05.11) Ductal carcinoma in situ (DCIS) of right breast      -The patient is doing well post-operatively.  -Surgical pathology reviewed with patient and .  This had also previ

## 2019-07-02 ENCOUNTER — TELEPHONE (OUTPATIENT)
Dept: SURGERY | Facility: CLINIC | Age: 54
End: 2019-07-02

## 2019-07-02 ENCOUNTER — TELEPHONE (OUTPATIENT)
Dept: HEMATOLOGY/ONCOLOGY | Facility: HOSPITAL | Age: 54
End: 2019-07-02

## 2019-07-02 NOTE — TELEPHONE ENCOUNTER
LVM for patient regarding medical oncology appointment- pt wanted to switch to 7/15, as this works better for her work schedule, appointment changed. Pt does have consultations set up with medical and radiation oncology.  YESIKA Quiroz phoned to discuss

## 2019-07-02 NOTE — TELEPHONE ENCOUNTER
Called patient to update her on tumor board discussion. Left detailed voicemail on cell per patient request. Left call back number to schedule appointment/discuss surgery.

## 2019-07-05 ENCOUNTER — TELEPHONE (OUTPATIENT)
Dept: SURGERY | Facility: CLINIC | Age: 54
End: 2019-07-05

## 2019-07-05 DIAGNOSIS — D05.11 DUCTAL CARCINOMA IN SITU (DCIS) OF RIGHT BREAST: Primary | ICD-10-CM

## 2019-07-05 RX ORDER — HEPARIN SODIUM 5000 [USP'U]/ML
5000 INJECTION, SOLUTION INTRAVENOUS; SUBCUTANEOUS ONCE
Status: CANCELLED | OUTPATIENT
Start: 2019-07-05 | End: 2019-07-05

## 2019-07-05 NOTE — TELEPHONE ENCOUNTER
-Called to schedule surgery for re-excision right breast for DCIS.   -The case had been discussed at our multidisciplinary tumor board and my recommendations reflect those of the tumor board.  -The surgical treatment matter including indications, rationale, you specific instructions about when and where to arrive the day of your procedure.    · If you are taking blood thinners including: Plavix, Eliquis, Coumadin you will need to contact the prescribing provider for specific instructions on holding these medic

## 2019-07-08 ENCOUNTER — TELEPHONE (OUTPATIENT)
Dept: HEMATOLOGY/ONCOLOGY | Facility: HOSPITAL | Age: 54
End: 2019-07-08

## 2019-07-08 ENCOUNTER — TELEPHONE (OUTPATIENT)
Dept: SURGERY | Facility: CLINIC | Age: 54
End: 2019-07-08

## 2019-07-08 NOTE — TELEPHONE ENCOUNTER
Call to pt to discuss surgery time. LVM informing pt that due to the other surgeries scheduled on 7/12/19 we are unable to move the surgery time up earlier. Advised pt to contact the office if she would like to rescheduled her surgery.

## 2019-07-08 NOTE — TELEPHONE ENCOUNTER
Spoke with patient regarding re-excision. Pt states she'd like to leave surgery day as July 12th. Radiation RN had called her re: re scheduling appointment, she is going to phone her back as well. No other questions or concerns.

## 2019-07-09 ENCOUNTER — APPOINTMENT (OUTPATIENT)
Dept: HEMATOLOGY/ONCOLOGY | Facility: HOSPITAL | Age: 54
End: 2019-07-09
Payer: COMMERCIAL

## 2019-07-12 ENCOUNTER — ANESTHESIA EVENT (OUTPATIENT)
Dept: SURGERY | Facility: HOSPITAL | Age: 54
End: 2019-07-12
Payer: COMMERCIAL

## 2019-07-12 ENCOUNTER — HOSPITAL ENCOUNTER (OUTPATIENT)
Facility: HOSPITAL | Age: 54
Setting detail: HOSPITAL OUTPATIENT SURGERY
Discharge: HOME | End: 2019-07-12
Attending: SURGERY | Admitting: SURGERY
Payer: COMMERCIAL

## 2019-07-12 ENCOUNTER — ANESTHESIA (OUTPATIENT)
Dept: SURGERY | Facility: HOSPITAL | Age: 54
End: 2019-07-12
Payer: COMMERCIAL

## 2019-07-12 ENCOUNTER — APPOINTMENT (OUTPATIENT)
Dept: RADIATION ONCOLOGY | Age: 54
End: 2019-07-12
Attending: RADIOLOGY
Payer: COMMERCIAL

## 2019-07-12 VITALS
WEIGHT: 220.88 LBS | HEIGHT: 63 IN | DIASTOLIC BLOOD PRESSURE: 73 MMHG | SYSTOLIC BLOOD PRESSURE: 123 MMHG | HEART RATE: 78 BPM | OXYGEN SATURATION: 97 % | BODY MASS INDEX: 39.14 KG/M2 | RESPIRATION RATE: 18 BRPM | TEMPERATURE: 99 F

## 2019-07-12 DIAGNOSIS — D05.11 DUCTAL CARCINOMA IN SITU (DCIS) OF RIGHT BREAST: ICD-10-CM

## 2019-07-12 LAB
EXPIRATION DATE: NORMAL
POCT LOT NUMBER: NORMAL
POCT URINE PREGNANCY: NEGATIVE

## 2019-07-12 PROCEDURE — 0HBT0ZZ EXCISION OF RIGHT BREAST, OPEN APPROACH: ICD-10-PCS | Performed by: SURGERY

## 2019-07-12 PROCEDURE — 88307 TISSUE EXAM BY PATHOLOGIST: CPT | Performed by: SURGERY

## 2019-07-12 PROCEDURE — 88305 TISSUE EXAM BY PATHOLOGIST: CPT | Performed by: SURGERY

## 2019-07-12 PROCEDURE — 81025 URINE PREGNANCY TEST: CPT | Performed by: SURGERY

## 2019-07-12 RX ORDER — BUPIVACAINE HYDROCHLORIDE 2.5 MG/ML
INJECTION, SOLUTION INFILTRATION; PERINEURAL AS NEEDED
Status: DISCONTINUED | OUTPATIENT
Start: 2019-07-12 | End: 2019-07-12 | Stop reason: HOSPADM

## 2019-07-12 RX ORDER — HYDROCODONE BITARTRATE AND ACETAMINOPHEN 10; 325 MG/1; MG/1
2 TABLET ORAL AS NEEDED
Status: DISCONTINUED | OUTPATIENT
Start: 2019-07-12 | End: 2019-07-12

## 2019-07-12 RX ORDER — SODIUM CHLORIDE, SODIUM LACTATE, POTASSIUM CHLORIDE, CALCIUM CHLORIDE 600; 310; 30; 20 MG/100ML; MG/100ML; MG/100ML; MG/100ML
INJECTION, SOLUTION INTRAVENOUS CONTINUOUS
Status: DISCONTINUED | OUTPATIENT
Start: 2019-07-12 | End: 2019-07-12

## 2019-07-12 RX ORDER — ONDANSETRON 2 MG/ML
4 INJECTION INTRAMUSCULAR; INTRAVENOUS AS NEEDED
Status: DISCONTINUED | OUTPATIENT
Start: 2019-07-12 | End: 2019-07-12

## 2019-07-12 RX ORDER — HYDROMORPHONE HYDROCHLORIDE 1 MG/ML
0.4 INJECTION, SOLUTION INTRAMUSCULAR; INTRAVENOUS; SUBCUTANEOUS EVERY 5 MIN PRN
Status: DISCONTINUED | OUTPATIENT
Start: 2019-07-12 | End: 2019-07-12

## 2019-07-12 RX ORDER — ACETAMINOPHEN 500 MG
1000 TABLET ORAL ONCE
Status: COMPLETED | OUTPATIENT
Start: 2019-07-12 | End: 2019-07-12

## 2019-07-12 RX ORDER — TRAMADOL HYDROCHLORIDE 50 MG/1
50 TABLET ORAL EVERY 6 HOURS PRN
Qty: 40 TABLET | Refills: 0 | Status: SHIPPED | OUTPATIENT
Start: 2019-07-12 | End: 2019-08-07

## 2019-07-12 RX ORDER — CEFAZOLIN SODIUM/WATER 2 G/20 ML
SYRINGE (ML) INTRAVENOUS
Status: DISCONTINUED
Start: 2019-07-12 | End: 2019-07-12

## 2019-07-12 RX ORDER — CEFAZOLIN SODIUM/WATER 2 G/20 ML
2 SYRINGE (ML) INTRAVENOUS ONCE
Status: DISCONTINUED | OUTPATIENT
Start: 2019-07-12 | End: 2019-07-12 | Stop reason: HOSPADM

## 2019-07-12 RX ORDER — HYDROCODONE BITARTRATE AND ACETAMINOPHEN 5; 325 MG/1; MG/1
1 TABLET ORAL AS NEEDED
Status: COMPLETED | OUTPATIENT
Start: 2019-07-12 | End: 2019-07-12

## 2019-07-12 RX ORDER — HYDROCODONE BITARTRATE AND ACETAMINOPHEN 5; 325 MG/1; MG/1
2 TABLET ORAL AS NEEDED
Status: COMPLETED | OUTPATIENT
Start: 2019-07-12 | End: 2019-07-12

## 2019-07-12 RX ORDER — HYDROCODONE BITARTRATE AND ACETAMINOPHEN 10; 325 MG/1; MG/1
1 TABLET ORAL AS NEEDED
Status: DISCONTINUED | OUTPATIENT
Start: 2019-07-12 | End: 2019-07-12

## 2019-07-12 RX ORDER — NALOXONE HYDROCHLORIDE 0.4 MG/ML
80 INJECTION, SOLUTION INTRAMUSCULAR; INTRAVENOUS; SUBCUTANEOUS AS NEEDED
Status: DISCONTINUED | OUTPATIENT
Start: 2019-07-12 | End: 2019-07-12

## 2019-07-12 NOTE — ANESTHESIA POSTPROCEDURE EVALUATION
[facility] Patient Status:  Hospital Outpatient Surgery   Age/Gender 48year old female MRN [id number]   [facility] SURGERY Attending Harvey Escobar MD   Hosp Day # 0 PCP Estee Keys MD       Anesthesia Post-op Note

## 2019-07-12 NOTE — H&P
[facility]    Patient Name:  Fabiano Soto   YOB: 1965   Gender:  Female   Appt Date:  7/12/2019   Provider:  No name on file.    Insurance:  [address] [place] [address]         CHIEF COMPLAINT  DCIS R breast  Involved margins Oral Tab Take 2,000 mg by mouth daily. Disp:  Rfl:    acetaminophen 500 MG Oral Tab Take 1,000 mg by mouth every 6 (six) hours as needed for Pain.  Disp:  Rfl:           Allergies Reviewed:    Christine Son           OTHER (SEE COMMENTS)    Comment:Rhinit no bone pain. She reports no back pain. She reports no swollen glands. She reports no pain. She reports no numbness. She reports no shortness of breath. She reports no change in a mole.  She reports no recent change in weight, no fever, no chills, and no sw

## 2019-07-12 NOTE — BRIEF OP NOTE
Pre-Operative Diagnosis: Ductal carcinoma in situ (DCIS) of right breast [D05.11]     Post-Operative Diagnosis: Ductal carcinoma in situ (DCIS) of right breast [D05.11]      Procedure Performed:     Re-excision right breast mass    Surgeon(s) and Role:

## 2019-07-12 NOTE — ANESTHESIA PREPROCEDURE EVALUATION
PRE-OP EVALUATION    Patient Name: Rubén Arellano    Pre-op Diagnosis: Ductal carcinoma in situ (DCIS) of right breast [D05.11]    Procedure(s):  Re-excision right breast mass    Surgeon(s) and Role:     Jarred Terrell MD - Primary     * Marcelino Cifuentes Cholecalciferol (VITAMIN D) 2000 UNITS Oral Tab Take 2,000 mg by mouth daily. Disp:  Rfl:        Allergies: Bermuda Grass; Sulfites      Anesthesia Evaluation    Patient summary reviewed.     Anesthetic Complications  (-) history of anesthetic complicat general  NPO status verified and patient meets guidelines. Post-procedure pain management plan discussed with surgeon and patient.       Plan/risks discussed with: patient                Present on Admission:  **None**

## 2019-07-15 ENCOUNTER — OFFICE VISIT (OUTPATIENT)
Dept: HEMATOLOGY/ONCOLOGY | Age: 54
End: 2019-07-15
Attending: INTERNAL MEDICINE
Payer: COMMERCIAL

## 2019-07-15 ENCOUNTER — TELEPHONE (OUTPATIENT)
Dept: SURGERY | Facility: CLINIC | Age: 54
End: 2019-07-15

## 2019-07-15 VITALS
OXYGEN SATURATION: 98 % | WEIGHT: 227.13 LBS | TEMPERATURE: 99 F | RESPIRATION RATE: 20 BRPM | HEART RATE: 78 BPM | BODY MASS INDEX: 40 KG/M2 | DIASTOLIC BLOOD PRESSURE: 73 MMHG | SYSTOLIC BLOOD PRESSURE: 113 MMHG

## 2019-07-15 DIAGNOSIS — D05.11 DUCTAL CARCINOMA IN SITU (DCIS) OF RIGHT BREAST: Primary | ICD-10-CM

## 2019-07-15 PROCEDURE — 99244 OFF/OP CNSLTJ NEW/EST MOD 40: CPT | Performed by: INTERNAL MEDICINE

## 2019-07-15 NOTE — TELEPHONE ENCOUNTER
Informed patient her pathology from her re-excision is negative for malignancy. Post op appt.  Scheduled with Gena Montgomery PA-C on 7/18/19 at 12:30pm.

## 2019-07-15 NOTE — PROGRESS NOTES
Cancer Center Report of Consultation    Patient Name: Chacorta Plascencia   YOB: 1965   Medical Record Number: [id number]   CSN: [id number]   Consulting Physician: Felix Varela MD  Referring Physician(s): Bridgette Brady MD  Date of Consultati stents   • Other (copd) Mother         pulmonary fibrosis   • Stroke Other        Gyne History:  OB History     T0    L0    SAB0  TAB0  Ectopic0  Multiple0  Live Births0       Age of menarche: 17    Age of first birth: 41  LMP: / Social History Narrative      Not on file      Allergies:     Bermuda Grass           OTHER (SEE COMMENTS)    Comment:Rhinitis  Sulfites                    Comment:Swollen lips and uvula, sulfa drugs ok.     Current Medications:    Current Outpatient Medica problems   Psych: No anxiety/depression. Vital Signs:  [facility] 07/01/2019     ECOG PS: 0    Physical Examination:    General: Patient is alert and oriented x 3, not in acute distress. HEENT: EOMs intact. PERRL. Oropharynx is clear. Neck: No JVD.  No palpa Positive   Progesterone Receptor   -   16 100% Positive Cells Strong Positive        Labs:         Assessment and Plan:    # R breast DCIS (Tis Nx Mx) : 48year old that underwent w/u of abnormal screening mammogram.  She underwent biopsy followed by lumpe

## 2019-07-15 NOTE — PATIENT INSTRUCTIONS
Call [phone] to schedule appointments, speak to our billers, or . This number will also reach the on-call doctor for after hours, on weekends, and holidays. Call [phone] to speak to Dr. Erlinda Nails nurse.  We are here M-F 8:30am-4:30pm.

## 2019-07-15 NOTE — OPERATIVE REPORT
[address]    PATIENT'S NAME: Indigo Borden   ATTENDING PHYSICIAN: Librado Dyer. Pito Carey MD   OPERATING PHYSICIAN: Librado Dyer.  Pito Carey MD   PATIENT ACCOUNT#:   [id number]    LOCATION:  [address] [place] [address] 2 [address] 10  MEDICAL RECORD #:   [id number]       D in layers using 3-0 Vicryl and 4-0 Monocryl. Steri-Strips with Telfa and Tegaderm dressings were then applied. The patient tolerated the procedure well without immediate complications.   She was extubated in the operating room and was sent in stable condi

## 2019-07-18 ENCOUNTER — OFFICE VISIT (OUTPATIENT)
Dept: SURGERY | Facility: CLINIC | Age: 54
End: 2019-07-18
Payer: COMMERCIAL

## 2019-07-18 VITALS
HEART RATE: 74 BPM | BODY MASS INDEX: 39.23 KG/M2 | DIASTOLIC BLOOD PRESSURE: 88 MMHG | HEIGHT: 63 IN | TEMPERATURE: 99 F | OXYGEN SATURATION: 100 % | RESPIRATION RATE: 16 BRPM | WEIGHT: 221.38 LBS | SYSTOLIC BLOOD PRESSURE: 136 MMHG

## 2019-07-18 DIAGNOSIS — D05.11 DUCTAL CARCINOMA IN SITU (DCIS) OF RIGHT BREAST: Primary | ICD-10-CM

## 2019-07-18 PROCEDURE — 99024 POSTOP FOLLOW-UP VISIT: CPT | Performed by: PHYSICIAN ASSISTANT

## 2019-07-18 NOTE — PROGRESS NOTES
[facility] Surgical Oncology    Patient Name:  Chacorta Plascencia   YOB: 1965   Gender:  Female   Appt Date:  7/18/2019   Provider:  Talbot Mohs, PA   Insurance:  [facility]     PATIENT PROVIDERS  Primary Care Provider:Lionel Moulton 6 cm from the nipple (TriBar clip). Both sites positive for DCIS ER/NE+. The patient underwent right partial mastectomy with wire localization on 6/21/19. Final pathology with DCIS < 1 mm from the lateral margin.  Per out multidisciplinary tumor board di 05/24/2019    right breast   • Esophageal reflux    • Graves disease    • Hyperthyroidism    • Visual impairment     glasses      Reviewed:  Past Surgical History:   Procedure Laterality Date   • Cdh lapband program     • Other surgical history  06/21/2019 lumpectomy.  -No evidence of malignancy.     B. Right breast, 2:00, extra lateral margin, excision:  -Breast tissue with changes consistent with prior lumpectomy.  -No evidence of malignancy.     C.   Right breast, 2:00, extra deep margin, excision:  Mario Farooq

## 2019-07-19 ENCOUNTER — HOSPITAL ENCOUNTER (OUTPATIENT)
Dept: RADIATION ONCOLOGY | Age: 54
Discharge: HOME | End: 2019-07-19
Attending: RADIOLOGY
Payer: COMMERCIAL

## 2019-07-19 ENCOUNTER — APPOINTMENT (OUTPATIENT)
Dept: RADIATION ONCOLOGY | Age: 54
End: 2019-07-19
Payer: COMMERCIAL

## 2019-07-19 DIAGNOSIS — D05.11 DUCTAL CARCINOMA IN SITU (DCIS) OF RIGHT BREAST: Primary | ICD-10-CM

## 2019-07-19 NOTE — CONSULTS
[facility] RADIATION ONCOLOGY  CONSULTATION     PATIENT:   Lulu Tamez      11/24/1965    REFERRING MD:  Dr. Carmella Mathews  DIAGNOSIS:   Right breast DCIS  CC:    DCIS      HPI   65-year-old woman here with her best friend for consultation.   He micropapillary, solid, cribriform, and papillary types. -Associated microcalcifications.       -Focally involves an intraductal papilloma.   -DCIS is present at <1 mm (<0.1 cm) from the the lateral margin multifocally, < 1 mm (<0.1 cm) from the superi negative. PHYSICAL EXAM     ECO  GEN:  Well-appearing, no acute distress  HEENT:  No supraclavicular adenopathy  CV:  Regular rate and regular rhythm. BREASTS: Modest breast volume  LUNGS:   Clear to auscultation. ABDOMEN: Soft, non-tender.   EXT

## 2019-07-25 ENCOUNTER — TELEPHONE (OUTPATIENT)
Dept: SURGERY | Facility: CLINIC | Age: 54
End: 2019-07-25

## 2019-07-25 ENCOUNTER — OFFICE VISIT (OUTPATIENT)
Dept: SURGERY | Facility: CLINIC | Age: 54
End: 2019-07-25
Payer: COMMERCIAL

## 2019-07-25 VITALS
HEART RATE: 80 BPM | TEMPERATURE: 98 F | RESPIRATION RATE: 16 BRPM | DIASTOLIC BLOOD PRESSURE: 84 MMHG | SYSTOLIC BLOOD PRESSURE: 134 MMHG

## 2019-07-25 DIAGNOSIS — N64.89 SEROMA OF BREAST: Primary | ICD-10-CM

## 2019-07-25 DIAGNOSIS — L03.313 CELLULITIS OF CHEST WALL: ICD-10-CM

## 2019-07-25 PROCEDURE — 87205 SMEAR GRAM STAIN: CPT | Performed by: PHYSICIAN ASSISTANT

## 2019-07-25 PROCEDURE — 99024 POSTOP FOLLOW-UP VISIT: CPT | Performed by: PHYSICIAN ASSISTANT

## 2019-07-25 PROCEDURE — 87070 CULTURE OTHR SPECIMN AEROBIC: CPT | Performed by: PHYSICIAN ASSISTANT

## 2019-07-25 RX ORDER — CLINDAMYCIN HYDROCHLORIDE 300 MG/1
300 CAPSULE ORAL 3 TIMES DAILY
Qty: 21 CAPSULE | Refills: 0 | Status: SHIPPED | OUTPATIENT
Start: 2019-07-25 | End: 2019-08-01

## 2019-07-25 NOTE — PROGRESS NOTES
[facility] Surgical Oncology    Patient Name:  Maisha Quintanilla   YOB: 1965   Gender:  Female   Appt Date:  7/25/2019   Provider:  YESIKA Lazar   Insurance:  [employer]     PATIENT PROVIDERS  Primary Care Provider:Lionel Harris go into work [employer].       Vital Signs:  /84 (BP Location: Left arm, Patient Position: Sitting)   Pulse 80   Temp 97.6 °F (36.4 °C) (Tympanic)   Resp 16   LMP 07/01/2019      Medications Reviewed:    Current Outpatient Medications:   •  Clindamycin HCl • Spine surgery procedure unlisted  4671,1575      Reviewed Social History:  Social History    Tobacco Use      Smoking status: Never Smoker      Smokeless tobacco: Never Used    Alcohol use: Yes      Comment: social    Drug use: No     Reviewed:  Family

## 2019-07-25 NOTE — TELEPHONE ENCOUNTER
Spoke to pt and advised her that per Chanel Emery PA-C she should be seen in the office to be examined. Pt verbalized understanding.  Appt made for 7/25/19 at 1pm.

## 2019-07-25 NOTE — TELEPHONE ENCOUNTER
----- Message from  [address]. Rosemary Dale sent at 7/25/2019  7:41 AM CDT -----  Regarding: Other  Contact: [phone]  Pepe Ordoñez,  I am concerned about the looks of my incision. It appears reddened between the incision & the nipple.   It is just warm to touch,

## 2019-07-31 ENCOUNTER — OFFICE VISIT (OUTPATIENT)
Dept: SURGERY | Facility: CLINIC | Age: 54
End: 2019-07-31
Payer: COMMERCIAL

## 2019-07-31 VITALS
BODY MASS INDEX: 39 KG/M2 | TEMPERATURE: 98 F | SYSTOLIC BLOOD PRESSURE: 119 MMHG | WEIGHT: 222.63 LBS | DIASTOLIC BLOOD PRESSURE: 76 MMHG | HEART RATE: 84 BPM

## 2019-07-31 DIAGNOSIS — N61.0 CELLULITIS OF BREAST: ICD-10-CM

## 2019-07-31 DIAGNOSIS — D05.11 DUCTAL CARCINOMA IN SITU (DCIS) OF RIGHT BREAST: Primary | ICD-10-CM

## 2019-07-31 PROCEDURE — 99024 POSTOP FOLLOW-UP VISIT: CPT | Performed by: SURGERY

## 2019-07-31 NOTE — PROGRESS NOTES
[facility]    Patient Name:  Solomon Young   YOB: 1965   Gender:  Female   Appt Date:  7/31/2019   Provider:  Josias Martinez MD   Insurance:  [facility]  Primary Care Provider:Lionel Barlow LMP 07/01/2019   BMI 39.43 kg/m²      Medications Reviewed:    Current Outpatient Medications:   •  Clindamycin HCl 300 MG Oral Cap, Take 1 capsule (300 mg total) by mouth 3 (three) times daily for 7 days. , Disp: 21 capsule, Rfl: 0  •  traMADol HCl 50 MG Smoker      Smokeless tobacco: Never Used    Alcohol use: Yes      Comment: social    Drug use: No     Reviewed:  Family History   Problem Relation Age of Onset   • Hypertension Father    • Lipids Father    • Obesity Father    • Diabetes Father    • Cancer

## 2019-08-01 ENCOUNTER — HOSPITAL ENCOUNTER (OUTPATIENT)
Dept: RADIATION ONCOLOGY | Age: 54
Discharge: HOME | End: 2019-08-01
Attending: RADIOLOGY
Payer: COMMERCIAL

## 2019-08-05 ENCOUNTER — HOSPITAL ENCOUNTER (OUTPATIENT)
Dept: RADIATION ONCOLOGY | Age: 54
Discharge: HOME | End: 2019-08-05
Attending: RADIOLOGY
Payer: COMMERCIAL

## 2019-08-05 PROCEDURE — 77333 RADIATION TREATMENT AID(S): CPT | Performed by: RADIOLOGY

## 2019-08-05 PROCEDURE — 77290 THER RAD SIMULAJ FIELD CPLX: CPT | Performed by: RADIOLOGY

## 2019-08-07 ENCOUNTER — OFFICE VISIT (OUTPATIENT)
Dept: FAMILY MEDICINE CLINIC | Facility: CLINIC | Age: 54
End: 2019-08-07
Payer: COMMERCIAL

## 2019-08-07 VITALS
SYSTOLIC BLOOD PRESSURE: 124 MMHG | TEMPERATURE: 98 F | WEIGHT: 223 LBS | HEIGHT: 63 IN | DIASTOLIC BLOOD PRESSURE: 80 MMHG | RESPIRATION RATE: 20 BRPM | BODY MASS INDEX: 39.51 KG/M2 | OXYGEN SATURATION: 98 % | HEART RATE: 86 BPM

## 2019-08-07 DIAGNOSIS — J40 BRONCHITIS: Primary | ICD-10-CM

## 2019-08-07 PROCEDURE — 77300 RADIATION THERAPY DOSE PLAN: CPT | Performed by: RADIOLOGY

## 2019-08-07 PROCEDURE — 99213 OFFICE O/P EST LOW 20 MIN: CPT | Performed by: NURSE PRACTITIONER

## 2019-08-07 PROCEDURE — 77295 3-D RADIOTHERAPY PLAN: CPT | Performed by: RADIOLOGY

## 2019-08-07 PROCEDURE — 77334 RADIATION TREATMENT AID(S): CPT | Performed by: RADIOLOGY

## 2019-08-07 RX ORDER — ALBUTEROL SULFATE 90 UG/1
AEROSOL, METERED RESPIRATORY (INHALATION)
Qty: 1 INHALER | Refills: 0 | Status: SHIPPED | OUTPATIENT
Start: 2019-08-07 | End: 2020-05-27

## 2019-08-07 RX ORDER — BENZONATATE 200 MG/1
200 CAPSULE ORAL 3 TIMES DAILY PRN
Qty: 20 CAPSULE | Refills: 0 | Status: SHIPPED | OUTPATIENT
Start: 2019-08-07 | End: 2020-05-27

## 2019-08-07 NOTE — PROGRESS NOTES
CHIEF COMPLAINT:   Patient presents with:  Cough: with sore throat wet, s/s for 6 days. OTC meds used        HPI:   Farhan Valles is a 48year old female who presents for cough for  4  days.   Cough started gradually and is described as dry and hacking No rashes, or other skin lesions. EYES: Denies blurred vision or double vision. HENT:  See HPI  CARDIOVASCULAR: Denies palpitations  LUNGS: Per HPI. GI: Denies N/V/C/D or abdominal pain.       EXAM:   /80   Pulse 86   Temp 97.9 °F (36.6 °C) (Oral the tubes that carry air into the lungs, called the “bronchi,” get infected. Usually, bronchitis happens when a person gets a cold or the flu. The viruses that cause the cold or flu infect the bronchi and irritate them.    Bronchitis can also happen when warm, moist air, such as in the shower, over a kettle, or from a humidifier  How can I keep from getting bronchitis again? — You can reduce your chance of getting bronchitis again by keeping the germs that cause bronchitis out of your body.  One of the best

## 2019-08-14 ENCOUNTER — HOSPITAL ENCOUNTER (OUTPATIENT)
Dept: RADIATION ONCOLOGY | Age: 54
Discharge: HOME | End: 2019-08-14
Attending: RADIOLOGY
Payer: COMMERCIAL

## 2019-08-14 PROCEDURE — 77280 THER RAD SIMULAJ FIELD SMPL: CPT | Performed by: RADIOLOGY

## 2019-08-14 PROCEDURE — 77412 RADIATION TX DELIVERY LVL 3: CPT | Performed by: RADIOLOGY

## 2019-08-15 PROCEDURE — 77412 RADIATION TX DELIVERY LVL 3: CPT | Performed by: RADIOLOGY

## 2019-08-15 PROCEDURE — 77417 THER RADIOLOGY PORT IMAGE(S): CPT | Performed by: RADIOLOGY

## 2019-08-16 PROCEDURE — 77412 RADIATION TX DELIVERY LVL 3: CPT | Performed by: RADIOLOGY

## 2019-08-16 PROCEDURE — 77290 THER RAD SIMULAJ FIELD CPLX: CPT | Performed by: RADIOLOGY

## 2019-08-19 ENCOUNTER — HOSPITAL ENCOUNTER (OUTPATIENT)
Dept: RADIATION ONCOLOGY | Age: 54
Discharge: HOME | End: 2019-08-19
Attending: RADIOLOGY
Payer: COMMERCIAL

## 2019-08-19 DIAGNOSIS — F41.1 GAD (GENERALIZED ANXIETY DISORDER): ICD-10-CM

## 2019-08-19 PROCEDURE — 77412 RADIATION TX DELIVERY LVL 3: CPT | Performed by: RADIOLOGY

## 2019-08-19 RX ORDER — MONTELUKAST SODIUM 10 MG/1
TABLET ORAL
Qty: 90 TABLET | Refills: 4 | Status: SHIPPED | OUTPATIENT
Start: 2019-08-19 | End: 2020-06-12

## 2019-08-19 RX ORDER — ESCITALOPRAM OXALATE 10 MG/1
TABLET ORAL
Qty: 90 TABLET | Refills: 4 | Status: SHIPPED | OUTPATIENT
Start: 2019-08-19 | End: 2020-08-11

## 2019-08-19 NOTE — PROGRESS NOTES
[facility] Radiation Treatment Management Note 1-5    Patient:  January Sanz  Age:  48year old  Visit Diagnosis:  No diagnosis found.   Primary Rad/Onc:  Dr. Dale Dior    Site Delivered Dose (Gy) Prescribed Dose (Gy) Fraction

## 2019-08-19 NOTE — TELEPHONE ENCOUNTER
Medication(s) to Refill:   Requested Prescriptions     Pending Prescriptions Disp Refills   • ESCITALOPRAM 10 MG Oral Tab [Pharmacy Med Name: ESCITALOPRAM TABS 10MG] 90 tablet 4     Sig: TAKE 1 TABLET DAILY   • MONTELUKAST SODIUM 10 MG Oral Tab [Pharmacy M

## 2019-08-20 PROCEDURE — 77412 RADIATION TX DELIVERY LVL 3: CPT | Performed by: RADIOLOGY

## 2019-08-21 PROCEDURE — 77412 RADIATION TX DELIVERY LVL 3: CPT | Performed by: RADIOLOGY

## 2019-08-22 PROCEDURE — 77412 RADIATION TX DELIVERY LVL 3: CPT | Performed by: RADIOLOGY

## 2019-08-23 PROCEDURE — 77417 THER RADIOLOGY PORT IMAGE(S): CPT | Performed by: RADIOLOGY

## 2019-08-23 PROCEDURE — 77336 RADIATION PHYSICS CONSULT: CPT | Performed by: RADIOLOGY

## 2019-08-23 PROCEDURE — 77412 RADIATION TX DELIVERY LVL 3: CPT | Performed by: RADIOLOGY

## 2019-08-26 ENCOUNTER — HOSPITAL ENCOUNTER (OUTPATIENT)
Dept: RADIATION ONCOLOGY | Age: 54
Discharge: HOME | End: 2019-08-26
Attending: RADIOLOGY
Payer: COMMERCIAL

## 2019-08-26 VITALS
HEART RATE: 63 BPM | DIASTOLIC BLOOD PRESSURE: 68 MMHG | TEMPERATURE: 99 F | RESPIRATION RATE: 17 BRPM | SYSTOLIC BLOOD PRESSURE: 95 MMHG | OXYGEN SATURATION: 99 %

## 2019-08-26 DIAGNOSIS — D05.11 DUCTAL CARCINOMA IN SITU (DCIS) OF RIGHT BREAST: Primary | ICD-10-CM

## 2019-08-26 PROCEDURE — 77412 RADIATION TX DELIVERY LVL 3: CPT | Performed by: RADIOLOGY

## 2019-08-27 PROCEDURE — 77412 RADIATION TX DELIVERY LVL 3: CPT | Performed by: RADIOLOGY

## 2019-08-28 PROCEDURE — 77412 RADIATION TX DELIVERY LVL 3: CPT | Performed by: RADIOLOGY

## 2019-08-29 PROCEDURE — 77412 RADIATION TX DELIVERY LVL 3: CPT | Performed by: RADIOLOGY

## 2019-08-30 PROCEDURE — 77417 THER RADIOLOGY PORT IMAGE(S): CPT | Performed by: RADIOLOGY

## 2019-08-30 PROCEDURE — 77412 RADIATION TX DELIVERY LVL 3: CPT | Performed by: RADIOLOGY

## 2019-08-30 PROCEDURE — 77336 RADIATION PHYSICS CONSULT: CPT | Performed by: RADIOLOGY

## 2019-09-01 ENCOUNTER — HOSPITAL ENCOUNTER (OUTPATIENT)
Dept: RADIATION ONCOLOGY | Age: 54
Discharge: HOME | End: 2019-09-01
Attending: RADIOLOGY
Payer: COMMERCIAL

## 2019-09-03 ENCOUNTER — HOSPITAL ENCOUNTER (OUTPATIENT)
Dept: RADIATION ONCOLOGY | Age: 54
Discharge: HOME | End: 2019-09-03
Attending: RADIOLOGY
Payer: COMMERCIAL

## 2019-09-03 VITALS
WEIGHT: 226.63 LBS | HEART RATE: 61 BPM | DIASTOLIC BLOOD PRESSURE: 79 MMHG | OXYGEN SATURATION: 100 % | BODY MASS INDEX: 40 KG/M2 | SYSTOLIC BLOOD PRESSURE: 131 MMHG | TEMPERATURE: 99 F | RESPIRATION RATE: 20 BRPM

## 2019-09-03 DIAGNOSIS — D05.11 DUCTAL CARCINOMA IN SITU (DCIS) OF RIGHT BREAST: Primary | ICD-10-CM

## 2019-09-03 PROCEDURE — 77412 RADIATION TX DELIVERY LVL 3: CPT | Performed by: RADIOLOGY

## 2019-09-03 NOTE — PATIENT INSTRUCTIONS
POST-RADIATION INSTRUCTIONS:   - CALL [phone] FOR A FOLLOW-UP WITH DR. NAIDU IN DECEMBER 2019  - SIDE EFFECTS OF RADIATION WILL GRADUALLY SUBSIDE.  IT MAY TAKE 2 - 3  WEEKS POST-RADIATION FOR YOU TO NOTICE CHANGES; SUCH AS A DECREASE IN YOUR FATIGUE L

## 2019-09-03 NOTE — PROGRESS NOTES
[facility] Radiation Treatment Management Note 11-15    Patient:  Diane Peralta  Age:  48year old  Visit Diagnosis:    1.  Ductal carcinoma in situ (DCIS) of right breast      Primary Rad/Onc:  Dr. Lisandra Vargas

## 2019-09-04 ENCOUNTER — DOCUMENTATION ONLY (OUTPATIENT)
Dept: RADIATION ONCOLOGY | Facility: HOSPITAL | Age: 54
End: 2019-09-04

## 2019-09-04 PROBLEM — Z82.49 FAMILY HISTORY OF ISCHEMIC HEART DISEASE: Status: ACTIVE | Noted: 2019-09-04

## 2019-09-04 PROBLEM — M54.50 LOW BACK PAIN: Status: ACTIVE | Noted: 2019-09-04

## 2019-09-04 PROBLEM — M17.9 OSTEOARTHRITIS OF KNEE: Status: ACTIVE | Noted: 2019-09-04

## 2019-09-04 PROBLEM — M17.10 OSTEOARTHRITIS OF KNEE: Status: ACTIVE | Noted: 2019-09-04

## 2019-09-04 PROBLEM — E05.00 GRAVES' DISEASE: Status: ACTIVE | Noted: 2019-09-04

## 2019-09-04 PROCEDURE — 77412 RADIATION TX DELIVERY LVL 3: CPT | Performed by: RADIOLOGY

## 2019-10-16 ENCOUNTER — PATIENT MESSAGE (OUTPATIENT)
Dept: FAMILY MEDICINE CLINIC | Facility: CLINIC | Age: 54
End: 2019-10-16

## 2019-10-16 NOTE — PROGRESS NOTES
[facility]  [facility]ATMENT [facility]     PATIENT:  Yudy Mitchell MD: Dr. Pamela Iraheta  DIAGNOSIS:  DCIS of right breast    HISTORY   62-year-old perimenopausal woman status post lumpectomy and reexcision for right upper i

## 2019-10-16 NOTE — TELEPHONE ENCOUNTER
From: Maryjane Alatorre  To: Lisa Alfaro MD  Sent: 10/16/2019 8:45 AM CDT  Subject: Referral Request    Dr. Jazz Mary    In May of this year, during my annual exam there were a few abnormal lab results. We communicated to repeat them @ a later date.   Now t

## 2019-10-28 ENCOUNTER — APPOINTMENT (OUTPATIENT)
Dept: HEMATOLOGY/ONCOLOGY | Age: 54
End: 2019-10-28
Attending: INTERNAL MEDICINE
Payer: COMMERCIAL

## 2019-11-03 NOTE — PROGRESS NOTES
Cancer Center Report of Consultation    Patient Name: Hellen Womack   YOB: 1965   Medical Record Number: [id number]   CSN: [id number]   Consulting Physician: Gracy Cano MD  Referring Physician(s): Jax Boykin MD  Date of Consultati Problem Relation Age of Onset   • Hypertension Father    • Lipids Father    • Obesity Father    • Diabetes Father    • Cancer Father         lung   • Other (cad) Father         stents   • Other (copd) Mother         pulmonary fibrosis   • Stroke Other Concern: Not Asked        Exercise: Not Asked        Seat Belt: Not Asked        Special Diet: Not Asked        Stress Concern: Not Asked        Weight Concern: Not Asked    Social History Narrative      Not on file      Allergies:     [address] [place] [address] wheezing, dyspnea on exertion  Cardiovascular: No chest pain, palpitations, syncope,orthopnea, PND, edema  Gastrointestinal:No dysphagia, odynophagia, nausea, vomiting, diarrhea, abdominal pain. Derm: No rash, skin lesions, and pruritus.   Hematologic/lymp is present at 3 mm (0.3 cm) from the final deep margin.     C.   Right breast, extra superior margins, excision:  -Small focus of atypical ductal hyperplasia (ADH).           PATHOLOGIC STAGE CLASSIFICATION (pTNM, AJCC 8th Edition)   TNM Descriptors  Not ap ample opportunity to ask questions. Santo Clarke M.D.     [facility]    [facility] [address], [place], [address]    7/15/2019

## 2019-11-04 ENCOUNTER — OFFICE VISIT (OUTPATIENT)
Dept: HEMATOLOGY/ONCOLOGY | Age: 54
End: 2019-11-04
Attending: INTERNAL MEDICINE
Payer: COMMERCIAL

## 2019-11-04 VITALS
TEMPERATURE: 97 F | HEART RATE: 67 BPM | RESPIRATION RATE: 20 BRPM | SYSTOLIC BLOOD PRESSURE: 131 MMHG | BODY MASS INDEX: 41 KG/M2 | OXYGEN SATURATION: 98 % | DIASTOLIC BLOOD PRESSURE: 82 MMHG | WEIGHT: 231.63 LBS

## 2019-11-04 DIAGNOSIS — T50.905A ADVERSE EFFECT OF DRUG, INITIAL ENCOUNTER: ICD-10-CM

## 2019-11-04 DIAGNOSIS — D05.11 DUCTAL CARCINOMA IN SITU (DCIS) OF RIGHT BREAST: Primary | ICD-10-CM

## 2019-11-04 PROCEDURE — 99214 OFFICE O/P EST MOD 30 MIN: CPT | Performed by: INTERNAL MEDICINE

## 2019-11-04 RX ORDER — TAMOXIFEN CITRATE 20 MG/1
20 TABLET ORAL DAILY
Qty: 90 TABLET | Refills: 3 | Status: SHIPPED | OUTPATIENT
Start: 2019-11-04 | End: 2020-06-10

## 2019-11-04 NOTE — PROGRESS NOTES
[facility] Progress Note      Patient Name:  Richard Pascal  YOB: 1965  Medical Record Number:  [id number]    Date of visit:  11/4/2019    CHIEF COMPLAINT: Right breast DCIS status post surgery and RT.    HPI:     48year old t (six) hours as needed for Pain. • Levothyroxine Sodium 112 MCG Oral Tab Take 1 tablet (112 mcg total) by mouth daily. 90 tablet 2   • Esomeprazole Magnesium 20 MG Oral Capsule Delayed Release 20 mg 2 (two) times daily.      • Coenzyme Q10 (COQ-10) 200 M visit. Matthew Paniagua M.D.     [facility]    [facility] [facility], [address]    11/4/2019

## 2019-11-04 NOTE — PROGRESS NOTES
Pt here for 4mth MD f/u visit for DCIS. Continues on Tamoxifen daily.    Outpatient Oncology Care Plan  Problem list:  knowledge deficit    Problems related to:    disease/disease progression  side effect of treatment    Interventions:  provided general tea

## 2019-11-08 ENCOUNTER — TELEPHONE (OUTPATIENT)
Dept: HEMATOLOGY/ONCOLOGY | Facility: HOSPITAL | Age: 54
End: 2019-11-08

## 2019-11-08 NOTE — TELEPHONE ENCOUNTER
----- Message from [address] Jovita Dukes sent at 11/8/2019  3:41 PM CST -----  Regarding: RE: Prescription Question  Contact: [phone]  Marielos,  I just verified from [facility] that it has been 20mg 2x/day.   I have been getting double what I should

## 2019-11-08 NOTE — TELEPHONE ENCOUNTER
Attempted to talk with pt over the phone. Left VM to reassure her that MD is aware and that it will not harm her to have taken Tamoxifen 20 mg BID. Pt only needs to take 20 mg once daily from this point on.  Requested that pt call back or At[employer] message wi

## 2019-11-27 DIAGNOSIS — E89.0 POSTABLATIVE HYPOTHYROIDISM: Primary | ICD-10-CM

## 2019-11-27 DIAGNOSIS — E78.5 HYPERLIPIDEMIA, UNSPECIFIED HYPERLIPIDEMIA TYPE: Primary | ICD-10-CM

## 2019-12-09 ENCOUNTER — OFFICE VISIT (OUTPATIENT)
Dept: ENDOCRINOLOGY CLINIC | Facility: CLINIC | Age: 54
End: 2019-12-09
Payer: COMMERCIAL

## 2019-12-09 VITALS
WEIGHT: 232 LBS | BODY MASS INDEX: 41 KG/M2 | DIASTOLIC BLOOD PRESSURE: 84 MMHG | HEART RATE: 68 BPM | SYSTOLIC BLOOD PRESSURE: 135 MMHG

## 2019-12-09 DIAGNOSIS — E03.8 HYPOTHYROIDISM DUE TO HASHIMOTO'S THYROIDITIS: Primary | ICD-10-CM

## 2019-12-09 DIAGNOSIS — E06.3 HYPOTHYROIDISM DUE TO HASHIMOTO'S THYROIDITIS: Primary | ICD-10-CM

## 2019-12-09 PROCEDURE — 99213 OFFICE O/P EST LOW 20 MIN: CPT | Performed by: INTERNAL MEDICINE

## 2019-12-09 NOTE — PROGRESS NOTES
Name: Lady Jacob  Date: 12/9/2019    Referring Physician: No ref. provider found    Patient presents with: Follow - Up: Thyroid. Since LOV pt was diagnosed with breastcancer.  Pt is f/u with       HISTORY OF PRESENT ILLNESS   Lady Jacob is a Disp: 90 tablet, Rfl: 4  •  Albuterol Sulfate HFA (PROAIR HFA) 108 (90 Base) MCG/ACT Inhalation Aero Soln, 2 puffs every 4-6 hours as needed, Disp: 1 Inhaler, Rfl: 0  •  benzonatate 200 MG Oral Cap, Take 1 capsule (200 mg total) by mouth 3 (three) times da 7/12/2019    Performed by Gela Shea MD at [address]   • OTHER SURGICAL HISTORY  06/21/2019    Right partial mastectomy with wire localization   • OTHER SURGICAL HISTORY  07/12/2019    Right breast re-excision of margins   • [address] [facility] [address]

## 2019-12-20 ENCOUNTER — MED REC SCAN ONLY (OUTPATIENT)
Dept: FAMILY MEDICINE CLINIC | Facility: CLINIC | Age: 54
End: 2019-12-20

## 2020-01-06 DIAGNOSIS — E89.0 POSTABLATIVE HYPOTHYROIDISM: ICD-10-CM

## 2020-01-06 RX ORDER — LEVOTHYROXINE SODIUM 112 UG/1
112 TABLET ORAL DAILY
Qty: 90 TABLET | Refills: 2 | Status: SHIPPED | OUTPATIENT
Start: 2020-01-06 | End: 2020-06-12

## 2020-01-06 NOTE — TELEPHONE ENCOUNTER
Current Outpatient Medications   Medication Sig Dispense Refill   • Levothyroxine Sodium 112 MCG Oral Tab Take 1 tablet (112 mcg total) by mouth daily.  90 tablet 2     Refill

## 2020-01-15 ENCOUNTER — PATIENT MESSAGE (OUTPATIENT)
Dept: ENDOCRINOLOGY CLINIC | Facility: CLINIC | Age: 55
End: 2020-01-15

## 2020-01-15 NOTE — TELEPHONE ENCOUNTER
From: Dez Cevallos  To: Matty Tabares MD  Sent: 1/15/2020 8:12 AM CST  Subject: Prescription Question    Could you please send a 102 day supply Rx to [facility]? As of January 1 we were switched to this mail order  Thank you!   Nimco Caceres

## 2020-02-17 ENCOUNTER — PATIENT MESSAGE (OUTPATIENT)
Dept: ENDOCRINOLOGY CLINIC | Facility: CLINIC | Age: 55
End: 2020-02-17

## 2020-02-17 NOTE — TELEPHONE ENCOUNTER
From: Shana Napier  To: Yousuf Suarez MD  Sent: 2/17/2020 9:04 AM CST  Subject: Prescription Question    Dr. Shana Romano,  At my last visit we talked about working on losing weight. I am finding since starting Tamoxifen for breast cancer I am ALWAYS hungry.

## 2020-02-18 NOTE — TELEPHONE ENCOUNTER
Patient asking if she can skip RN teaching since she is knowledgeable regarding injections. Requesting script for Ozempic to mail order pharmacy.

## 2020-02-19 NOTE — TELEPHONE ENCOUNTER
Sure that is fine. Start Ozempic 0.25mg SQ weekly for one month the increase to 0.5mg SQ weekly. Ok to send 3 month supply to [facility]. Thanks.

## 2020-02-24 ENCOUNTER — PATIENT MESSAGE (OUTPATIENT)
Dept: ENDOCRINOLOGY CLINIC | Facility: CLINIC | Age: 55
End: 2020-02-24

## 2020-02-25 ENCOUNTER — PATIENT MESSAGE (OUTPATIENT)
Dept: ENDOCRINOLOGY CLINIC | Facility: CLINIC | Age: 55
End: 2020-02-25

## 2020-02-26 NOTE — TELEPHONE ENCOUNTER
See patient message 2/24/2020. Called [facility] to request PA form to be faxed over. Ozempic is requiring a PA as it is a step therapy. Can attempt PA through CoverMyMeds (not initiated yet). [phone]: phone number for Prior Authorizations.

## 2020-02-26 NOTE — TELEPHONE ENCOUNTER
From: Kelley Tidwell  To: All Andre MD  Sent: 2/24/2020 2:09 PM CST  Subject: Prescription Question    Hello,  Just received a letter from [employer] stating they have been trying to reach the doctor to obtain prior authorization.   It is regarding Fuad

## 2020-02-26 NOTE — TELEPHONE ENCOUNTER
From: Maryjane Alatorre  To: Denise Chahal MD  Sent: 2/25/2020 8:03 PM CST  Subject: Other    Michaelry for the multiple emails. ...... I was sent additional information from [employer] for prior authorization.     OZEMPIC .25/.5 MG DOSE PEN requires a prior author

## 2020-02-28 NOTE — TELEPHONE ENCOUNTER
Medication PA Requested: Ozempic                                                     CoverMyMeds Used: Yes  Key: [id number]  Sig: Inject 0.25 mg into the skin every 7 days for 10 days, THEN 0.5 mg every 7 days.    DX Code:

## 2020-03-02 NOTE — TELEPHONE ENCOUNTER
Contacted telephone number listed below as I was unable to use Key below or create my own case through covermymeds due to being told that covermymeds does not handle cases for this patient's plan.  Per Laura with [employer], PAs are completed online at [facility]

## 2020-03-03 NOTE — TELEPHONE ENCOUNTER
PA denied, due to \"under the patients health plan, appetite suppressants, anorexiants and anti-obesity drugs are subject to the exclusions stated in the Exclusions section page 58 of the Contract. \"     Please advise.

## 2020-03-05 NOTE — TELEPHONE ENCOUNTER
Per patients pharmacy benefits on [facility], the medications searched are coming up as preferred level 97. Byetta, Victoza, Bydureon, Trulicity, and Ozempic (denied).      Patient would need a diagnosis of DM2 to get these medications approved through [facility]

## 2020-03-10 ENCOUNTER — TELEPHONE (OUTPATIENT)
Dept: HEMATOLOGY/ONCOLOGY | Facility: HOSPITAL | Age: 55
End: 2020-03-10

## 2020-04-23 ENCOUNTER — LAB ENCOUNTER (OUTPATIENT)
Dept: LAB | Facility: HOSPITAL | Age: 55
End: 2020-04-23
Attending: FAMILY MEDICINE
Payer: COMMERCIAL

## 2020-04-23 ENCOUNTER — VIRTUAL PHONE E/M (OUTPATIENT)
Dept: FAMILY MEDICINE CLINIC | Facility: CLINIC | Age: 55
End: 2020-04-23
Payer: COMMERCIAL

## 2020-04-23 DIAGNOSIS — R68.89 FLU-LIKE SYMPTOMS: ICD-10-CM

## 2020-04-23 DIAGNOSIS — D05.11 DUCTAL CARCINOMA IN SITU (DCIS) OF RIGHT BREAST: ICD-10-CM

## 2020-04-23 DIAGNOSIS — Z20.822 CLOSE EXPOSURE TO COVID-19 VIRUS: ICD-10-CM

## 2020-04-23 DIAGNOSIS — R68.89 FLU-LIKE SYMPTOMS: Primary | ICD-10-CM

## 2020-04-23 PROCEDURE — 99213 OFFICE O/P EST LOW 20 MIN: CPT | Performed by: FAMILY MEDICINE

## 2020-04-23 NOTE — PROGRESS NOTES
Please note that the following visit was completed using two-way, real-time interactive audio and video communication.   This has been done in good zonia to provide continuity of care in the best interest of the provider-patient relationship, due to the on- breast re-excision of margins   • SPINE SURGERY PROCEDURE UNLISTED  1494,3459     Social History:  Social History    Tobacco Use      Smoking status: Never Smoker      Smokeless tobacco: Never Used    Alcohol use: Yes      Comment: social    Drug use:  No Cholecalciferol (VITAMIN D) 2000 UNITS Oral Tab Take 2,000 mg by mouth daily. Counseling given: Not Answered       REVIEW OF SYSTEMS:   Patient denies shortness of breath, denies chest pain and denies any recent fevers or chills.     Patient report

## 2020-05-18 ENCOUNTER — HOSPITAL ENCOUNTER (OUTPATIENT)
Dept: MAMMOGRAPHY | Facility: HOSPITAL | Age: 55
Discharge: HOME | End: 2020-05-18
Attending: RADIOLOGY
Payer: COMMERCIAL

## 2020-05-18 DIAGNOSIS — D05.11 DUCTAL CARCINOMA IN SITU (DCIS) OF RIGHT BREAST: ICD-10-CM

## 2020-05-18 PROCEDURE — 77066 DX MAMMO INCL CAD BI: CPT | Performed by: RADIOLOGY

## 2020-05-18 PROCEDURE — 77062 BREAST TOMOSYNTHESIS BI: CPT | Performed by: RADIOLOGY

## 2020-05-21 ENCOUNTER — APPOINTMENT (OUTPATIENT)
Dept: HEMATOLOGY/ONCOLOGY | Facility: HOSPITAL | Age: 55
End: 2020-05-21
Attending: INTERNAL MEDICINE
Payer: COMMERCIAL

## 2020-05-26 NOTE — PROGRESS NOTES
[facility] Progress Note      Patient Name:  Kelley Tidwell  YOB: 1965  Medical Record Number:  [id number]    Date of visit:  5/27/2020    CHIEF COMPLAINT: Right breast DCIS status post surgery and RT.    HPI:     48year old t (COQ-10) 200 MG Oral Cap      • Multiple Vitamins-Minerals (MULTIVITAL) Oral Tab Take 1 tablet by mouth daily. • Cholecalciferol (VITAMIN D) 2000 UNITS Oral Tab Take 2,000 mg by mouth daily.            VITALS:  Height: --  Weight: --  BSA (Calculated -

## 2020-05-27 ENCOUNTER — OFFICE VISIT (OUTPATIENT)
Dept: HEMATOLOGY/ONCOLOGY | Facility: HOSPITAL | Age: 55
End: 2020-05-27
Attending: INTERNAL MEDICINE
Payer: COMMERCIAL

## 2020-05-27 VITALS
TEMPERATURE: 98 F | RESPIRATION RATE: 16 BRPM | BODY MASS INDEX: 40.93 KG/M2 | DIASTOLIC BLOOD PRESSURE: 69 MMHG | HEART RATE: 77 BPM | WEIGHT: 231 LBS | HEIGHT: 63 IN | SYSTOLIC BLOOD PRESSURE: 122 MMHG | OXYGEN SATURATION: 98 %

## 2020-05-27 DIAGNOSIS — D05.11 DUCTAL CARCINOMA IN SITU (DCIS) OF RIGHT BREAST: Primary | ICD-10-CM

## 2020-05-27 DIAGNOSIS — T50.905A ADVERSE EFFECT OF DRUG, INITIAL ENCOUNTER: ICD-10-CM

## 2020-05-27 PROCEDURE — 99213 OFFICE O/P EST LOW 20 MIN: CPT | Performed by: INTERNAL MEDICINE

## 2020-05-27 NOTE — PROGRESS NOTES
Pt feeling well. tolerable hot flashes. Imaging is up to date. She had a period in March 2020. Had not had a period since last year July 2019. Asking about female OB/gyne.   Education Record    Learner:  Patient    Disease / [place] of care

## 2020-06-10 DIAGNOSIS — D05.11 DUCTAL CARCINOMA IN SITU (DCIS) OF RIGHT BREAST: Primary | ICD-10-CM

## 2020-06-10 RX ORDER — TAMOXIFEN CITRATE 20 MG/1
20 TABLET ORAL DAILY
Qty: 90 TABLET | Refills: 3 | Status: SHIPPED | OUTPATIENT
Start: 2020-06-10 | End: 2021-08-02

## 2020-06-12 DIAGNOSIS — E89.0 POSTABLATIVE HYPOTHYROIDISM: ICD-10-CM

## 2020-06-12 RX ORDER — MONTELUKAST SODIUM 10 MG/1
TABLET ORAL
Qty: 90 TABLET | Refills: 0 | Status: SHIPPED | OUTPATIENT
Start: 2020-06-12 | End: 2020-08-11

## 2020-06-12 NOTE — TELEPHONE ENCOUNTER
Levothyroxine Sodium 112 mcg rx request. Please review and sign off if appropriate. Thank you.     Last office visit: 12/9/2020  RTC: 1 year  Last refill: 1/6/2020

## 2020-06-12 NOTE — TELEPHONE ENCOUNTER
•  Levothyroxine Sodium 112 MCG Oral Tab, Take 1 tablet (112 mcg total) by mouth daily. , Disp: 90 tablet, Rfl: 2

## 2020-06-13 RX ORDER — LEVOTHYROXINE SODIUM 112 UG/1
112 TABLET ORAL DAILY
Qty: 90 TABLET | Refills: 0 | Status: SHIPPED | OUTPATIENT
Start: 2020-06-13 | End: 2020-08-11

## 2020-06-23 ENCOUNTER — TELEPHONE (OUTPATIENT)
Dept: OBGYN CLINIC | Facility: CLINIC | Age: 55
End: 2020-06-23

## 2020-07-31 ENCOUNTER — PATIENT MESSAGE (OUTPATIENT)
Dept: FAMILY MEDICINE CLINIC | Facility: CLINIC | Age: 55
End: 2020-07-31

## 2020-07-31 DIAGNOSIS — Z13.0 SCREENING, IRON DEFICIENCY ANEMIA: Primary | ICD-10-CM

## 2020-07-31 DIAGNOSIS — E78.00 ELEVATED CHOLESTEROL: ICD-10-CM

## 2020-07-31 DIAGNOSIS — E89.0 POSTABLATIVE HYPOTHYROIDISM: ICD-10-CM

## 2020-07-31 NOTE — TELEPHONE ENCOUNTER
From: Poonam Peters  To: Dayan Lundberg MD  Sent: 7/31/2020 8:17 AM CDT  Subject: Referral Request    Dr. Joshua Vogt,  I will be making an appt for my annual exam soon. Inquiring if any additional labs to be done besides Lipid Panel, TSH & CMP?   Could you p

## 2020-08-10 DIAGNOSIS — F41.1 GAD (GENERALIZED ANXIETY DISORDER): ICD-10-CM

## 2020-08-10 DIAGNOSIS — E89.0 POSTABLATIVE HYPOTHYROIDISM: ICD-10-CM

## 2020-08-10 NOTE — TELEPHONE ENCOUNTER
Medication(s) to Refill:   Requested Prescriptions     Pending Prescriptions Disp Refills   • Montelukast Sodium 10 MG Oral Tab [Pharmacy Med Name: MONTELUKAST 10MG TABLET] 90 tablet 3     Sig: TAKE 1 TABLET BY MOUTH  DAILY   • ESCITALOPRAM 10 MG Oral Tab

## 2020-08-11 RX ORDER — ESCITALOPRAM OXALATE 10 MG/1
TABLET ORAL
Qty: 90 TABLET | Refills: 3 | Status: SHIPPED | OUTPATIENT
Start: 2020-08-11 | End: 2021-10-19

## 2020-08-11 RX ORDER — MONTELUKAST SODIUM 10 MG/1
TABLET ORAL
Qty: 90 TABLET | Refills: 3 | Status: SHIPPED | OUTPATIENT
Start: 2020-08-11 | End: 2021-12-28

## 2020-08-11 RX ORDER — LEVOTHYROXINE SODIUM 112 UG/1
TABLET ORAL
Qty: 90 TABLET | Refills: 1 | Status: SHIPPED | OUTPATIENT
Start: 2020-08-11 | End: 2020-12-07

## 2020-08-11 NOTE — TELEPHONE ENCOUNTER
LOV 12/2019. RTC 1 year. Refilled through yearly follow up date per [facility] protocol.

## 2020-08-20 LAB
ABSOLUTE BASOPHILS: 52 CELLS/UL (ref 0–200)
ABSOLUTE EOSINOPHILS: 99 CELLS/UL (ref 15–500)
ABSOLUTE LYMPHOCYTES: 1161 CELLS/UL (ref 850–3900)
ABSOLUTE MONOCYTES: 268 CELLS/UL (ref 200–950)
ABSOLUTE NEUTROPHILS: 3121 CELLS/UL (ref 1500–7800)
ALBUMIN/GLOBULIN RATIO: 1.3 (CALC) (ref 1–2.5)
ALBUMIN: 3.7 G/DL (ref 3.6–5.1)
ALKALINE PHOSPHATASE: 50 U/L (ref 37–153)
ALT: 12 U/L (ref 6–29)
AST: 13 U/L (ref 10–35)
BASOPHILS: 1.1 %
BILIRUBIN, TOTAL: 0.3 MG/DL (ref 0.2–1.2)
BUN: 9 MG/DL (ref 7–25)
CALCIUM: 8.8 MG/DL (ref 8.6–10.4)
CARBON DIOXIDE: 28 MMOL/L (ref 20–32)
CHLORIDE: 104 MMOL/L (ref 98–110)
CHOL/HDLC RATIO: 2.7 (CALC)
CHOLESTEROL, TOTAL: 216 MG/DL
CREATININE: 0.9 MG/DL (ref 0.5–1.05)
EGFR IF AFRICN AM: 85 ML/MIN/1.73M2
EGFR IF NONAFRICN AM: 74 ML/MIN/1.73M2
EOSINOPHILS: 2.1 %
GLOBULIN: 2.8 G/DL (CALC) (ref 1.9–3.7)
GLUCOSE: 87 MG/DL (ref 65–99)
HDL CHOLESTEROL: 79 MG/DL
HEMATOCRIT: 36 % (ref 35–45)
HEMOGLOBIN: 12.1 G/DL (ref 11.7–15.5)
LDL-CHOLESTEROL: 119 MG/DL (CALC)
LYMPHOCYTES: 24.7 %
MCH: 29.2 PG (ref 27–33)
MCHC: 33.6 G/DL (ref 32–36)
MCV: 86.7 FL (ref 80–100)
MONOCYTES: 5.7 %
MPV: 10.6 FL (ref 7.5–12.5)
NEUTROPHILS: 66.4 %
NON-HDL CHOLESTEROL: 137 MG/DL (CALC)
PLATELET COUNT: 200 THOUSAND/UL (ref 140–400)
POTASSIUM: 4.5 MMOL/L (ref 3.5–5.3)
PROTEIN, TOTAL: 6.5 G/DL (ref 6.1–8.1)
RDW: 11.7 % (ref 11–15)
RED BLOOD CELL COUNT: 4.15 MILLION/UL (ref 3.8–5.1)
SODIUM: 140 MMOL/L (ref 135–146)
TRIGLYCERIDES: 83 MG/DL
TSH W/REFLEX TO FT4: 0.44 MIU/L
WHITE BLOOD CELL COUNT: 4.7 THOUSAND/UL (ref 3.8–10.8)

## 2020-08-26 ENCOUNTER — OFFICE VISIT (OUTPATIENT)
Dept: FAMILY MEDICINE CLINIC | Facility: CLINIC | Age: 55
End: 2020-08-26
Payer: COMMERCIAL

## 2020-08-26 VITALS
HEIGHT: 63 IN | TEMPERATURE: 98 F | OXYGEN SATURATION: 95 % | HEART RATE: 82 BPM | DIASTOLIC BLOOD PRESSURE: 76 MMHG | SYSTOLIC BLOOD PRESSURE: 122 MMHG | WEIGHT: 226 LBS | RESPIRATION RATE: 16 BRPM | BODY MASS INDEX: 40.04 KG/M2

## 2020-08-26 DIAGNOSIS — Z00.00 ANNUAL PHYSICAL EXAM: Primary | ICD-10-CM

## 2020-08-26 DIAGNOSIS — Z20.822 CLOSE EXPOSURE TO COVID-19 VIRUS: ICD-10-CM

## 2020-08-26 DIAGNOSIS — E78.2 MIXED HYPERLIPIDEMIA: ICD-10-CM

## 2020-08-26 DIAGNOSIS — K21.9 GASTROESOPHAGEAL REFLUX DISEASE WITHOUT ESOPHAGITIS: ICD-10-CM

## 2020-08-26 DIAGNOSIS — Z98.84 HISTORY OF GASTRIC BYPASS: ICD-10-CM

## 2020-08-26 DIAGNOSIS — K31.83 GASTRIC ACIDITY, ABSENT: ICD-10-CM

## 2020-08-26 PROCEDURE — 3008F BODY MASS INDEX DOCD: CPT | Performed by: FAMILY MEDICINE

## 2020-08-26 PROCEDURE — 3074F SYST BP LT 130 MM HG: CPT | Performed by: FAMILY MEDICINE

## 2020-08-26 PROCEDURE — 3078F DIAST BP <80 MM HG: CPT | Performed by: FAMILY MEDICINE

## 2020-08-26 PROCEDURE — 99396 PREV VISIT EST AGE 40-64: CPT | Performed by: FAMILY MEDICINE

## 2020-08-26 RX ORDER — PANTOPRAZOLE SODIUM 40 MG/1
40 TABLET, DELAYED RELEASE ORAL
Qty: 60 TABLET | Refills: 3 | Status: SHIPPED | OUTPATIENT
Start: 2020-08-26 | End: 2020-09-17

## 2020-08-29 NOTE — PROGRESS NOTES
HPI:    Andreea Mejia is a 48year old female who presents for Physical   Presenting for annual physical, history of gastric bypass many years ago, has been having worsening epigastric pain, no swallowing problems.        Past History:   She  has a past 97.8 °F (36.6 °C) (Temporal)   Resp 16   Ht 63\"   Wt 226 lb (102.5 kg)   SpO2 95%   BMI 40.03 kg/m²    Estimated body mass index is 40.03 kg/m² as calculated from the following:    Height as of this encounter: 63\".     Weight as of this encounter: 226 lb MG Oral Tab EC; Take 1 tablet (40 mg total) by mouth every morning before breakfast.  -     XR VIDEO SWALLOW (CPT=37148); Future  -     SARS-COV-2 BY PCR (); Future    History of gastric bypass  -     XR VIDEO SWALLOW (CPT=13630);  Future  -     SARS-C

## 2020-09-04 LAB — SARS COV 2 AB IGG: POSITIVE

## 2020-09-17 ENCOUNTER — PATIENT MESSAGE (OUTPATIENT)
Dept: FAMILY MEDICINE CLINIC | Facility: CLINIC | Age: 55
End: 2020-09-17

## 2020-09-17 DIAGNOSIS — K21.9 GASTROESOPHAGEAL REFLUX DISEASE WITHOUT ESOPHAGITIS: ICD-10-CM

## 2020-09-17 DIAGNOSIS — K31.83 GASTRIC ACIDITY, ABSENT: ICD-10-CM

## 2020-09-17 NOTE — TELEPHONE ENCOUNTER
From: Ralph Cardenas  To: Yoel Gaines MD  Sent: 9/17/2020 8:23 AM CDT  Subject: Prescription Question    Requested a refill from [facility] re: my Pantorazole. Dr. Neal Kwan said I can take it daily or bid if needed. They only gave me 30 pills.  Could you pleas

## 2020-09-17 NOTE — TELEPHONE ENCOUNTER
Patient states that she was instructed that she could take her Protonix BID if needed. Patient's Rx is written for only once daily. Patient is asking for an updated Rx. Please approve or deny pending Rx. Thank you!

## 2020-09-18 RX ORDER — PANTOPRAZOLE SODIUM 40 MG/1
40 TABLET, DELAYED RELEASE ORAL
Qty: 60 TABLET | Refills: 3 | Status: SHIPPED | OUTPATIENT
Start: 2020-09-18 | End: 2020-10-01

## 2020-10-21 ENCOUNTER — HOSPITAL ENCOUNTER (OUTPATIENT)
Dept: MAMMOGRAPHY | Facility: HOSPITAL | Age: 55
Discharge: HOME | End: 2020-10-21
Attending: INTERNAL MEDICINE
Payer: COMMERCIAL

## 2020-10-21 DIAGNOSIS — D05.11 DUCTAL CARCINOMA IN SITU (DCIS) OF RIGHT BREAST: ICD-10-CM

## 2020-10-21 PROCEDURE — 77065 DX MAMMO INCL CAD UNI: CPT | Performed by: INTERNAL MEDICINE

## 2020-10-21 PROCEDURE — 77061 BREAST TOMOSYNTHESIS UNI: CPT | Performed by: INTERNAL MEDICINE

## 2020-12-02 ENCOUNTER — MED REC SCAN ONLY (OUTPATIENT)
Dept: FAMILY MEDICINE CLINIC | Facility: CLINIC | Age: 55
End: 2020-12-02

## 2020-12-04 DIAGNOSIS — E89.0 POSTABLATIVE HYPOTHYROIDISM: ICD-10-CM

## 2020-12-07 RX ORDER — LEVOTHYROXINE SODIUM 112 UG/1
TABLET ORAL
Qty: 102 TABLET | Refills: 0 | Status: SHIPPED | OUTPATIENT
Start: 2020-12-07 | End: 2021-08-02

## 2020-12-30 ENCOUNTER — PATIENT MESSAGE (OUTPATIENT)
Dept: FAMILY MEDICINE CLINIC | Facility: CLINIC | Age: 55
End: 2020-12-30

## 2020-12-30 NOTE — TELEPHONE ENCOUNTER
From: Nohemy Shepard  To: Kimber Khan MD  Sent: 12/30/2020 4:00 PM CST  Subject: Other    Dr. Dell Taylor,  Sorry to bother you about my letter of accommodation that was completed & submitted to my employer. .... 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

## 2021-04-21 ENCOUNTER — OFFICE VISIT (OUTPATIENT)
Dept: FAMILY MEDICINE CLINIC | Facility: CLINIC | Age: 56
End: 2021-04-21
Payer: COMMERCIAL

## 2021-04-21 VITALS
RESPIRATION RATE: 16 BRPM | SYSTOLIC BLOOD PRESSURE: 136 MMHG | DIASTOLIC BLOOD PRESSURE: 86 MMHG | TEMPERATURE: 97 F | HEART RATE: 76 BPM | HEIGHT: 63 IN | OXYGEN SATURATION: 92 % | WEIGHT: 256 LBS | BODY MASS INDEX: 45.36 KG/M2

## 2021-04-21 DIAGNOSIS — M17.11 OSTEOARTHRITIS OF RIGHT KNEE, UNSPECIFIED OSTEOARTHRITIS TYPE: Primary | ICD-10-CM

## 2021-04-21 DIAGNOSIS — G89.29 CHRONIC PAIN OF RIGHT KNEE: ICD-10-CM

## 2021-04-21 DIAGNOSIS — M25.561 CHRONIC PAIN OF RIGHT KNEE: ICD-10-CM

## 2021-04-21 DIAGNOSIS — M22.2X2 PATELLOFEMORAL ARTHRALGIA OF BOTH KNEES: ICD-10-CM

## 2021-04-21 DIAGNOSIS — M22.2X1 PATELLOFEMORAL ARTHRALGIA OF BOTH KNEES: ICD-10-CM

## 2021-04-21 DIAGNOSIS — E66.01 MORBID OBESITY (HCC): ICD-10-CM

## 2021-04-21 PROCEDURE — 3075F SYST BP GE 130 - 139MM HG: CPT | Performed by: FAMILY MEDICINE

## 2021-04-21 PROCEDURE — 99214 OFFICE O/P EST MOD 30 MIN: CPT | Performed by: FAMILY MEDICINE

## 2021-04-21 PROCEDURE — 3079F DIAST BP 80-89 MM HG: CPT | Performed by: FAMILY MEDICINE

## 2021-04-21 PROCEDURE — 3008F BODY MASS INDEX DOCD: CPT | Performed by: FAMILY MEDICINE

## 2021-04-21 RX ORDER — TRAMADOL HYDROCHLORIDE 50 MG/1
50 TABLET ORAL NIGHTLY PRN
Qty: 15 TABLET | Refills: 1 | Status: SHIPPED | OUTPATIENT
Start: 2021-04-21 | End: 2021-06-18

## 2021-04-21 RX ORDER — MELOXICAM 15 MG/1
15 TABLET ORAL DAILY PRN
Qty: 30 TABLET | Refills: 1 | Status: SHIPPED | OUTPATIENT
Start: 2021-04-21 | End: 2021-06-18

## 2021-04-25 NOTE — PROGRESS NOTES
HPI/Subjective:   Matty Carmona is a 50year old female who presents for Knee Pain   Patient reports pain is not under control. Location: right knee>left knee  He reports no injury.    Patient describes pain as an ache and radiates to right anterior kn of Systems:  Review of Systems   Constitutional: Negative. Respiratory: Negative. Cardiovascular: Negative. Gastrointestinal: Negative. Skin: Negative. Neurological: Negative. Right knee pain worse than left knee.      Objective:   BP 13 ORTHOPEDIC - INTERNAL    3. Patellofemoral arthralgia of both knees  -as above. Will continue with nsaids  - XR KNEE (1 OR 2 VIEWS), LEFT (CPT=73560); Future  - Meloxicam 15 MG Oral Tab; Take 1 tablet (15 mg total) by mouth daily as needed for Pain.   Dispe

## 2021-04-27 ENCOUNTER — PATIENT MESSAGE (OUTPATIENT)
Dept: FAMILY MEDICINE CLINIC | Facility: CLINIC | Age: 56
End: 2021-04-27

## 2021-04-27 NOTE — TELEPHONE ENCOUNTER
From: Driss Bermeo  To: Dayan Lundberg MD  Sent: 4/27/2021 11:54 AM CDT  Subject: Other    Dr. Jose Daniel Smith,  Just wanted to keep you posted. .... Alan Miramontes I am holding off on the X-ray & MRI for now. Pain to knees have significantly improved with the Mobic.  I will t

## 2021-04-30 ENCOUNTER — HOSPITAL ENCOUNTER (OUTPATIENT)
Dept: MAMMOGRAPHY | Facility: HOSPITAL | Age: 56
Discharge: HOME | End: 2021-04-30
Attending: INTERNAL MEDICINE
Payer: COMMERCIAL

## 2021-04-30 DIAGNOSIS — D05.11 DUCTAL CARCINOMA IN SITU (DCIS) OF RIGHT BREAST: ICD-10-CM

## 2021-04-30 PROCEDURE — 77062 BREAST TOMOSYNTHESIS BI: CPT | Performed by: INTERNAL MEDICINE

## 2021-04-30 PROCEDURE — 77066 DX MAMMO INCL CAD BI: CPT | Performed by: INTERNAL MEDICINE

## 2021-05-02 NOTE — PROGRESS NOTES
[facility] Progress Note      Patient Name:  Rey Rush  YOB: 1965  Medical Record Number:  [id number]    Date of visit:  5/3/2021    CHIEF COMPLAINT: R breast DCIS s/p surgery and RT.    HPI:     50year old that I have fol mg total) by mouth daily. 90 tablet 3   • acetaminophen 500 MG Oral Tab Take 1,000 mg by mouth every 6 (six) hours as needed for Pain.      • Coenzyme Q10 (COQ-10) 200 MG Oral Cap      • Multiple Vitamins-Minerals (MULTIVITAL) Oral Tab Take 1 tablet by mout endocrine therapy till 8/24. Last Pap smear done 4/19, overdue, encouraged her to see GYN for pelvic exam.    Once we receive [facility] labs, will do Rx for AI.     ORDERS PLACED:          ESTRADIOL [E]      [address] [E]      LH (LUTEINIZING HORMONE) [E]      DEXA

## 2021-05-03 ENCOUNTER — OFFICE VISIT (OUTPATIENT)
Dept: HEMATOLOGY/ONCOLOGY | Age: 56
End: 2021-05-03
Attending: INTERNAL MEDICINE
Payer: COMMERCIAL

## 2021-05-03 VITALS
SYSTOLIC BLOOD PRESSURE: 130 MMHG | TEMPERATURE: 99 F | RESPIRATION RATE: 18 BRPM | OXYGEN SATURATION: 98 % | WEIGHT: 258.63 LBS | BODY MASS INDEX: 46 KG/M2 | DIASTOLIC BLOOD PRESSURE: 85 MMHG | HEART RATE: 94 BPM

## 2021-05-03 DIAGNOSIS — T50.905A ADVERSE EFFECT OF DRUG, INITIAL ENCOUNTER: Primary | ICD-10-CM

## 2021-05-03 DIAGNOSIS — Z78.0 POSTMENOPAUSAL: ICD-10-CM

## 2021-05-03 DIAGNOSIS — D05.11 DUCTAL CARCINOMA IN SITU (DCIS) OF RIGHT BREAST: ICD-10-CM

## 2021-05-03 DIAGNOSIS — N91.2 AMENORRHEA: ICD-10-CM

## 2021-05-03 PROCEDURE — 99214 OFFICE O/P EST MOD 30 MIN: CPT | Performed by: INTERNAL MEDICINE

## 2021-05-03 RX ORDER — PHENTERMINE HYDROCHLORIDE 37.5 MG/1
TABLET ORAL
COMMUNITY
End: 2021-09-14

## 2021-05-03 NOTE — PROGRESS NOTES
MD follow up for right DCIS. Breast imaging up to date. Taking tamoxifen. Denies issues or side effects.    LMP 3/ 2020  Education Record    Learner:  Patient    Disease / Xavier Valiente right breast     Barriers / Limitations:  None   Comments:    Meth

## 2021-05-04 ENCOUNTER — HOSPITAL ENCOUNTER (OUTPATIENT)
Dept: MRI IMAGING | Age: 56
Discharge: HOME | End: 2021-05-04
Attending: FAMILY MEDICINE
Payer: COMMERCIAL

## 2021-05-04 ENCOUNTER — HOSPITAL ENCOUNTER (OUTPATIENT)
Dept: GENERAL RADIOLOGY | Age: 56
Discharge: HOME | End: 2021-05-04
Attending: FAMILY MEDICINE
Payer: COMMERCIAL

## 2021-05-04 ENCOUNTER — TELEPHONE (OUTPATIENT)
Dept: FAMILY MEDICINE CLINIC | Facility: CLINIC | Age: 56
End: 2021-05-04

## 2021-05-04 DIAGNOSIS — G89.29 CHRONIC PAIN OF RIGHT KNEE: ICD-10-CM

## 2021-05-04 DIAGNOSIS — M22.2X1 PATELLOFEMORAL ARTHRALGIA OF BOTH KNEES: ICD-10-CM

## 2021-05-04 DIAGNOSIS — M17.11 OSTEOARTHRITIS OF RIGHT KNEE, UNSPECIFIED OSTEOARTHRITIS TYPE: ICD-10-CM

## 2021-05-04 DIAGNOSIS — M25.561 CHRONIC PAIN OF RIGHT KNEE: ICD-10-CM

## 2021-05-04 DIAGNOSIS — M22.2X2 PATELLOFEMORAL ARTHRALGIA OF BOTH KNEES: ICD-10-CM

## 2021-05-04 DIAGNOSIS — M17.10 OSTEOARTHRITIS OF KNEE, UNSPECIFIED LATERALITY, UNSPECIFIED OSTEOARTHRITIS TYPE: Primary | ICD-10-CM

## 2021-05-04 PROCEDURE — 73560 X-RAY EXAM OF KNEE 1 OR 2: CPT | Performed by: FAMILY MEDICINE

## 2021-05-04 PROCEDURE — 73721 MRI JNT OF LWR EXTRE W/O DYE: CPT | Performed by: FAMILY MEDICINE

## 2021-05-04 NOTE — TELEPHONE ENCOUNTER
----- Message from Junita Duane, MD sent at 5/4/2021  3:04 PM CDT -----  Mild OA, normal knee xray otherwise. No signs of ligamental tear. Ok to do PT or conservative therapy, ok to see dr. Melecio Ugalde if she prefers.

## 2021-05-04 NOTE — TELEPHONE ENCOUNTER
Notified the patient of the below MRI results and recommendations. Patient verbalized understanding. Patient states that she already scheduled an appointment with Dr. Yumiko Blackburn on 5/19/2021. Answered all questions at this time.

## 2021-05-17 ENCOUNTER — TELEPHONE (OUTPATIENT)
Dept: ORTHOPEDICS CLINIC | Facility: CLINIC | Age: 56
End: 2021-05-17

## 2021-05-17 DIAGNOSIS — M25.561 RIGHT KNEE PAIN, UNSPECIFIED CHRONICITY: Primary | ICD-10-CM

## 2021-05-19 ENCOUNTER — HOSPITAL ENCOUNTER (OUTPATIENT)
Dept: GENERAL RADIOLOGY | Age: 56
Discharge: HOME | End: 2021-05-19
Attending: ORTHOPAEDIC SURGERY
Payer: COMMERCIAL

## 2021-05-19 ENCOUNTER — OFFICE VISIT (OUTPATIENT)
Dept: ORTHOPEDICS CLINIC | Facility: CLINIC | Age: 56
End: 2021-05-19
Payer: COMMERCIAL

## 2021-05-19 VITALS — BODY MASS INDEX: 43.45 KG/M2 | OXYGEN SATURATION: 99 % | WEIGHT: 260.81 LBS | HEART RATE: 75 BPM | HEIGHT: 65 IN

## 2021-05-19 DIAGNOSIS — E66.01 MORBID OBESITY (HCC): ICD-10-CM

## 2021-05-19 DIAGNOSIS — M17.11 PRIMARY OSTEOARTHRITIS OF RIGHT KNEE: Primary | ICD-10-CM

## 2021-05-19 DIAGNOSIS — M25.561 RIGHT KNEE PAIN, UNSPECIFIED CHRONICITY: ICD-10-CM

## 2021-05-19 PROCEDURE — 99203 OFFICE O/P NEW LOW 30 MIN: CPT | Performed by: ORTHOPAEDIC SURGERY

## 2021-05-19 PROCEDURE — 20610 DRAIN/INJ JOINT/BURSA W/O US: CPT | Performed by: ORTHOPAEDIC SURGERY

## 2021-05-19 PROCEDURE — 3008F BODY MASS INDEX DOCD: CPT | Performed by: ORTHOPAEDIC SURGERY

## 2021-05-19 PROCEDURE — 73564 X-RAY EXAM KNEE 4 OR MORE: CPT | Performed by: ORTHOPAEDIC SURGERY

## 2021-05-19 RX ORDER — TRIAMCINOLONE ACETONIDE 40 MG/ML
40 INJECTION, SUSPENSION INTRA-ARTICULAR; INTRAMUSCULAR ONCE
Status: COMPLETED | OUTPATIENT
Start: 2021-05-19 | End: 2021-05-19

## 2021-05-19 RX ADMIN — TRIAMCINOLONE ACETONIDE 40 MG: 40 INJECTION, SUSPENSION INTRA-ARTICULAR; INTRAMUSCULAR at 10:10:00

## 2021-05-19 NOTE — PATIENT INSTRUCTIONS
What Is Arthritis? Arthritis is a disease that affects the joints. Joints are the parts where bones meet and move. It can affect any joint in your body. There are many types of arthritis.  They include:   · Osteoarthritis  · Rheumatoid arthritis  · Gout  · The joint's range of motion can become limited. Symptoms  Arthritis can affect any joint. Weight-bearing joints, such as the hips and knees, are often affected. Common symptoms are joint pain and stiffness.  Pain and stiffness may get worse with inactivi pounds. Losing one single pound takes multiple pounds of pressure off the joints. Losing 10 pounds can take 50 pounds of pressure off the joints. The tips below may help:  · Start a weight-loss program with the help of your healthcare provider.   · Ask your exercise part of your life. Talk with your healthcare provider about what is safe for you. Make sure you:  · Choose exercises that improve joint motion and make your muscles stronger. Learn how to do exercises correctly and safely.  Consider talking with a Isometric exercises are done by tightening the muscles without moving the joint. This may be a good way to strengthen the muscles around a stiff joint. · Avoid deep flexion or deep squatting (do not bend the knee more than 90 degrees).   · Focus on closed- flexibility activities such as yoga and salinas chi may improve pain and joint motion.  You might try:  · Yoga, including chair yoga, helps to keep your joints strong and flexible  · Salinas Chi, an ancient type of exercise with slow, gentle movements  · Water exer your wrists or other joints  · A brace to support a weak knee joint  · Orthotics for toe and foot involvement    Medical and surgical treatments  Discuss medical treatments with your healthcare provider to help reduce your pain and improve joint mobility. The cartilage is smoothed. Any pieces of cartilage that have broken off are removed. · Total joint replacement. The entire joint is taken out and replaced with a manmade joint using metal, ceramic, or plastic.  This is most often done with the knee or hip

## 2021-05-19 NOTE — H&P
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   Patient reports this is been ongoin Sodium 10 MG Oral Tab TAKE 1 TABLET BY MOUTH  DAILY 90 tablet 3   • ESCITALOPRAM 10 MG Oral Tab TAKE 1 TABLET BY MOUTH  DAILY 90 tablet 3   • Tamoxifen Citrate 20 MG Oral Tab Take 1 tablet (20 mg total) by mouth daily.  90 tablet 3   • acetaminophen 500 MG 110  • Knee stable to varus and valgus stress  • Neuromuscular: 5 out of 5 quadricep strength, sensation intact light touch  • Vascular: Warm well perfused lower extremity      Imaging: X-rays of the right knee personally viewed, independently interpreted discussion and plan.     Bri Mercer MD, [address] [facility]  Phone [phone]  Fax [phone]

## 2021-06-17 DIAGNOSIS — M22.2X1 PATELLOFEMORAL ARTHRALGIA OF BOTH KNEES: ICD-10-CM

## 2021-06-17 DIAGNOSIS — G89.29 CHRONIC PAIN OF RIGHT KNEE: ICD-10-CM

## 2021-06-17 DIAGNOSIS — M22.2X2 PATELLOFEMORAL ARTHRALGIA OF BOTH KNEES: ICD-10-CM

## 2021-06-17 DIAGNOSIS — M17.11 OSTEOARTHRITIS OF RIGHT KNEE, UNSPECIFIED OSTEOARTHRITIS TYPE: ICD-10-CM

## 2021-06-17 DIAGNOSIS — M25.561 CHRONIC PAIN OF RIGHT KNEE: ICD-10-CM

## 2021-06-17 NOTE — TELEPHONE ENCOUNTER
Medication(s) to Refill:   Requested Prescriptions     Pending Prescriptions Disp Refills   • TRAMADOL HCL 50 MG Oral Tab [Pharmacy Med Name: Tramadol Hcl 50 Mg Tab [facility]] 15 tablet 0     Sig: Take 1 tablet (50 mg total) by mouth nightly as needed for Pain.

## 2021-06-18 RX ORDER — MELOXICAM 15 MG/1
15 TABLET ORAL DAILY PRN
Qty: 30 TABLET | Refills: 0 | Status: SHIPPED | OUTPATIENT
Start: 2021-06-18 | End: 2021-07-20

## 2021-06-18 RX ORDER — TRAMADOL HYDROCHLORIDE 50 MG/1
50 TABLET ORAL NIGHTLY PRN
Qty: 15 TABLET | Refills: 0 | Status: SHIPPED | OUTPATIENT
Start: 2021-06-18 | End: 2021-07-20

## 2021-07-19 DIAGNOSIS — G89.29 CHRONIC PAIN OF RIGHT KNEE: ICD-10-CM

## 2021-07-19 DIAGNOSIS — M17.11 OSTEOARTHRITIS OF RIGHT KNEE, UNSPECIFIED OSTEOARTHRITIS TYPE: ICD-10-CM

## 2021-07-19 DIAGNOSIS — M22.2X2 PATELLOFEMORAL ARTHRALGIA OF BOTH KNEES: ICD-10-CM

## 2021-07-19 DIAGNOSIS — M22.2X1 PATELLOFEMORAL ARTHRALGIA OF BOTH KNEES: ICD-10-CM

## 2021-07-19 DIAGNOSIS — M25.561 CHRONIC PAIN OF RIGHT KNEE: ICD-10-CM

## 2021-07-20 RX ORDER — MELOXICAM 15 MG/1
15 TABLET ORAL DAILY PRN
Qty: 30 TABLET | Refills: 0 | Status: SHIPPED | OUTPATIENT
Start: 2021-07-20 | End: 2021-07-23

## 2021-07-20 RX ORDER — TRAMADOL HYDROCHLORIDE 50 MG/1
50 TABLET ORAL NIGHTLY PRN
Qty: 15 TABLET | Refills: 0 | Status: SHIPPED | OUTPATIENT
Start: 2021-07-20 | End: 2021-07-23

## 2021-07-20 NOTE — TELEPHONE ENCOUNTER
Patient informed futher refills will need to come from Ortho. Patient states understanding and did not have any questions.

## 2021-07-21 ENCOUNTER — PATIENT MESSAGE (OUTPATIENT)
Dept: ORTHOPEDICS CLINIC | Facility: CLINIC | Age: 56
End: 2021-07-21

## 2021-07-21 DIAGNOSIS — M22.2X1 PATELLOFEMORAL ARTHRALGIA OF BOTH KNEES: ICD-10-CM

## 2021-07-21 DIAGNOSIS — M25.561 CHRONIC PAIN OF RIGHT KNEE: ICD-10-CM

## 2021-07-21 DIAGNOSIS — M17.11 OSTEOARTHRITIS OF RIGHT KNEE, UNSPECIFIED OSTEOARTHRITIS TYPE: ICD-10-CM

## 2021-07-21 DIAGNOSIS — M22.2X2 PATELLOFEMORAL ARTHRALGIA OF BOTH KNEES: ICD-10-CM

## 2021-07-21 DIAGNOSIS — G89.29 CHRONIC PAIN OF RIGHT KNEE: ICD-10-CM

## 2021-07-22 NOTE — TELEPHONE ENCOUNTER
From: Barbara Kuo  To: Lavon Courtney MD  Sent: 7/21/2021 6:49 PM CDT  Subject: Prescription Question    Dr. Marimar Rojas,  Injection only lasted about 2 weeks.   Hoping to obtain refills on my medications  Meloxicam 15mg daily #30  Tramadol 50mg daily #30

## 2021-07-23 RX ORDER — TRAMADOL HYDROCHLORIDE 50 MG/1
50 TABLET ORAL NIGHTLY PRN
Qty: 15 TABLET | Refills: 0 | Status: SHIPPED | OUTPATIENT
Start: 2021-07-23 | End: 2022-01-28

## 2021-07-23 RX ORDER — MELOXICAM 15 MG/1
15 TABLET ORAL DAILY PRN
Qty: 30 TABLET | Refills: 0 | Status: SHIPPED | OUTPATIENT
Start: 2021-07-23 | End: 2021-09-20

## 2021-07-30 ENCOUNTER — PATIENT MESSAGE (OUTPATIENT)
Dept: FAMILY MEDICINE CLINIC | Facility: CLINIC | Age: 56
End: 2021-07-30

## 2021-07-30 DIAGNOSIS — D05.11 DUCTAL CARCINOMA IN SITU (DCIS) OF RIGHT BREAST: ICD-10-CM

## 2021-07-30 DIAGNOSIS — E89.0 POSTABLATIVE HYPOTHYROIDISM: ICD-10-CM

## 2021-08-02 RX ORDER — TAMOXIFEN CITRATE 20 MG/1
TABLET ORAL
Qty: 102 TABLET | Refills: 2 | Status: SHIPPED | OUTPATIENT
Start: 2021-08-02

## 2021-08-02 NOTE — TELEPHONE ENCOUNTER
From: Abiola Flores  To: Anastasia Cheadle, MD  Sent: 7/30/2021 9:11 PM CDT  Subject: Prescription Question    Dr. Patrice June,  I unfortunately forgot to refill my Levothyroxine 112mcg. I was reminded by my mail order ([facility]) I need a new prescription.   They

## 2021-08-02 NOTE — TELEPHONE ENCOUNTER
LF: 12/7/20 by endocrinology  LOV: 4/21/21  Patient is requesting that you take over prescribing this. Please approve or deny pending. Thank you!

## 2021-08-02 NOTE — TELEPHONE ENCOUNTER
LOV: 12/09/19 RTC 1 year. No follow up scheduled. [employer] message sent on 12/7/21 to make an appointment but still no follow up scheduled. Left message to call back.

## 2021-08-03 RX ORDER — LEVOTHYROXINE SODIUM 112 UG/1
112 TABLET ORAL DAILY
Qty: 90 TABLET | Refills: 0 | Status: SHIPPED | OUTPATIENT
Start: 2021-08-03 | End: 2021-10-20

## 2021-08-04 ENCOUNTER — TELEPHONE (OUTPATIENT)
Dept: FAMILY MEDICINE CLINIC | Facility: CLINIC | Age: 56
End: 2021-08-04

## 2021-08-04 NOTE — TELEPHONE ENCOUNTER
Called Alyssa back and spoke with Fide Moura. Advised her okay to dispense. She states understanding. Patient notified via My Chart regarding Tramadol use.

## 2021-08-04 NOTE — TELEPHONE ENCOUNTER
Called [facility] and spoke with Rudy mishra. He states that patient got Tramadol from another provider. This along with her Lexarpo increases the risk for serotonin syndrome.   [facility] wants to verify that you are aware of this and is asking if okay to fill the Lexap

## 2021-08-04 NOTE — TELEPHONE ENCOUNTER
Awais from [place] called with questions on ESCITALOPRAM 10 MG Oral Tab   [facility]#[id number]. Pls call back.

## 2021-08-27 RX ORDER — LEVOTHYROXINE SODIUM 112 UG/1
TABLET ORAL
Qty: 30 TABLET | Refills: 0 | Status: SHIPPED | OUTPATIENT
Start: 2021-08-27 | End: 2021-09-14

## 2021-08-27 NOTE — TELEPHONE ENCOUNTER
Called to schedule first available. Patient reports she cannot schedule at this time, but will schedule via [facility] later. Pended 1 month supply.

## 2021-09-01 ENCOUNTER — MED REC SCAN ONLY (OUTPATIENT)
Dept: FAMILY MEDICINE CLINIC | Facility: CLINIC | Age: 56
End: 2021-09-01

## 2021-09-14 ENCOUNTER — OFFICE VISIT (OUTPATIENT)
Dept: OBGYN CLINIC | Facility: CLINIC | Age: 56
End: 2021-09-14
Payer: COMMERCIAL

## 2021-09-14 VITALS
WEIGHT: 264.81 LBS | DIASTOLIC BLOOD PRESSURE: 80 MMHG | HEART RATE: 76 BPM | SYSTOLIC BLOOD PRESSURE: 118 MMHG | BODY MASS INDEX: 44 KG/M2

## 2021-09-14 DIAGNOSIS — Z85.3 PERSONAL HISTORY OF BREAST CANCER: ICD-10-CM

## 2021-09-14 DIAGNOSIS — Z12.4 CERVICAL CANCER SCREENING: ICD-10-CM

## 2021-09-14 DIAGNOSIS — Z01.419 WELL WOMAN EXAM WITH ROUTINE GYNECOLOGICAL EXAM: Primary | ICD-10-CM

## 2021-09-14 PROCEDURE — 99386 PREV VISIT NEW AGE 40-64: CPT | Performed by: NURSE PRACTITIONER

## 2021-09-14 PROCEDURE — 3079F DIAST BP 80-89 MM HG: CPT | Performed by: NURSE PRACTITIONER

## 2021-09-14 PROCEDURE — 3074F SYST BP LT 130 MM HG: CPT | Performed by: NURSE PRACTITIONER

## 2021-09-14 NOTE — PROGRESS NOTES
Here for new gynecology visit. 50year old G 1 P 1. No LMP recorded (lmp unknown). Patient is perimenopausal.. Here for Annual Gynecologic Exam. She has no current concerns or questions.  She sees Oncology annually for continuation of care for a histo facility-administered medications on file prior to visit.       OB History    Para Term  AB Living   1 1 1     1   SAB IAB Ectopic Multiple Live Births           1      # Outcome Date GA Lbr Mike/2nd Weight Sex Delivery Anes PTL Lv   1 Term 10

## 2021-09-17 LAB — HPV MRNA E6/E7: NOT DETECTED

## 2021-09-18 DIAGNOSIS — M25.561 CHRONIC PAIN OF RIGHT KNEE: ICD-10-CM

## 2021-09-18 DIAGNOSIS — M22.2X2 PATELLOFEMORAL ARTHRALGIA OF BOTH KNEES: ICD-10-CM

## 2021-09-18 DIAGNOSIS — M17.11 OSTEOARTHRITIS OF RIGHT KNEE, UNSPECIFIED OSTEOARTHRITIS TYPE: ICD-10-CM

## 2021-09-18 DIAGNOSIS — M22.2X1 PATELLOFEMORAL ARTHRALGIA OF BOTH KNEES: ICD-10-CM

## 2021-09-18 DIAGNOSIS — G89.29 CHRONIC PAIN OF RIGHT KNEE: ICD-10-CM

## 2021-09-20 RX ORDER — MELOXICAM 15 MG/1
TABLET ORAL
Qty: 30 TABLET | Refills: 11 | Status: SHIPPED | OUTPATIENT
Start: 2021-09-20

## 2021-09-30 ENCOUNTER — PATIENT MESSAGE (OUTPATIENT)
Dept: FAMILY MEDICINE CLINIC | Facility: CLINIC | Age: 56
End: 2021-09-30

## 2021-09-30 NOTE — TELEPHONE ENCOUNTER
Pt requesting a letter for her employer to continue to work from home during the week when the office is at full staff r/t risk of COVID. Pt is vaccinated. Please advise on this, thanks.

## 2021-09-30 NOTE — TELEPHONE ENCOUNTER
From: Nieves Moody  To:  Yoel Gaines MD  Sent: 9/30/2021 11:40 AM CDT  Subject: Letter for Employer    Dr. Neal Kwan,  I am sorry to bother you but I have been asked by my employer for an updated letter requesting continued accommodations to work from [employer]

## 2021-10-19 DIAGNOSIS — F41.1 GAD (GENERALIZED ANXIETY DISORDER): ICD-10-CM

## 2021-10-19 DIAGNOSIS — E89.0 POSTABLATIVE HYPOTHYROIDISM: ICD-10-CM

## 2021-10-19 RX ORDER — ESCITALOPRAM OXALATE 10 MG/1
10 TABLET ORAL DAILY
Qty: 90 TABLET | Refills: 4 | Status: SHIPPED | OUTPATIENT
Start: 2021-10-19

## 2021-10-19 NOTE — TELEPHONE ENCOUNTER
LF: 8/11/20  LOV: 4/21/21  F/U: not on file     Pt is requesting one year supply.  Please approve or deny

## 2021-10-19 NOTE — TELEPHONE ENCOUNTER
•  Levothyroxine Sodium 112 MCG Oral Tab, Take 1 tablet (112 mcg total) by mouth daily. , Disp: 90 tablet, Rfl: 0    Requesting a 90 day supply

## 2021-10-20 RX ORDER — LEVOTHYROXINE SODIUM 112 UG/1
112 TABLET ORAL DAILY
Qty: 30 TABLET | Refills: 0 | Status: SHIPPED | OUTPATIENT
Start: 2021-10-20 | End: 2021-10-20

## 2021-10-20 NOTE — TELEPHONE ENCOUNTER
LOV 12/09/19. RTC 1 year. No f/u scheduled. Per 07/30/21 encounter pt states she could not schedule appt at this time and would schedule via [place]. Per Med list looks like med was filled by PCP 08/03/21 for 90 days supply.

## 2021-11-01 ENCOUNTER — PATIENT MESSAGE (OUTPATIENT)
Dept: FAMILY MEDICINE CLINIC | Facility: CLINIC | Age: 56
End: 2021-11-01

## 2021-11-01 RX ORDER — LEVOTHYROXINE SODIUM 112 UG/1
1 CAPSULE ORAL EVERY MORNING
Qty: 90 CAPSULE | Refills: 0 | Status: SHIPPED | OUTPATIENT
Start: 2021-11-01 | End: 2022-01-30

## 2021-11-01 RX ORDER — LEVOTHYROXINE SODIUM 112 UG/1
112 CAPSULE ORAL DAILY
Qty: 90 CAPSULE | Refills: 0 | Status: CANCELLED | OUTPATIENT
Start: 2021-11-01 | End: 2021-12-01

## 2021-11-01 NOTE — TELEPHONE ENCOUNTER
From: Indigo Brown  To: Junita Duane, MD  Sent: 11/1/2021 6:57 AM CDT  Subject: Levothyroxine    I have been on Levothyroxine 112mcg for a few years now. I do not see this on my mediation profile. I no longer see the endocrinologist d/t stable levels.

## 2021-11-01 NOTE — TELEPHONE ENCOUNTER
Pt sent message requesting for a 3 month supply of Levothyroxine Sodium 112 MCG Oral Tablet. Per pt, she no longer see's an endocrinologist as her levels have been stable. Please approve or deny pending Rx. Thank you.    LOV: 04/21/21  LF: 08/03/21

## 2021-11-08 NOTE — TELEPHONE ENCOUNTER
Spoke with Mairetta Izaguirre at [facility] that they received the order for levothyroxine 112mcg capsules. Patient was previously on tablets. And insurance does not cover capsules. Provided verbal order for tablets.  (same dose, frequency and quantity)  Notifie

## 2021-12-28 RX ORDER — MONTELUKAST SODIUM 10 MG/1
TABLET ORAL
Qty: 102 TABLET | Refills: 2 | Status: SHIPPED | OUTPATIENT
Start: 2021-12-28

## 2021-12-31 ENCOUNTER — PATIENT MESSAGE (OUTPATIENT)
Dept: FAMILY MEDICINE CLINIC | Facility: CLINIC | Age: 56
End: 2021-12-31

## 2022-01-01 NOTE — TELEPHONE ENCOUNTER
From: Clarisse Apo  To:  Camelia Ann MD  Sent: 12/31/2021 8:43 AM CST  Subject: Abisai Alcantar Letter Needed    Dr. Corrina Samayoa,    In 2020 you completed a letter of accommodation for [facility] to work remotely during the weekdays when

## 2022-01-14 RX ORDER — LEVOTHYROXINE SODIUM 112 UG/1
TABLET ORAL
Qty: 90 TABLET | Refills: 3 | Status: SHIPPED | OUTPATIENT
Start: 2022-01-14

## 2022-01-28 RX ORDER — TRAMADOL HYDROCHLORIDE 50 MG/1
TABLET ORAL
Qty: 15 TABLET | Refills: 0 | Status: SHIPPED | OUTPATIENT
Start: 2022-01-28

## 2022-03-08 RX ORDER — TAMOXIFEN CITRATE 20 MG/1
TABLET ORAL
Qty: 102 TABLET | Refills: 2 | Status: SHIPPED | OUTPATIENT
Start: 2022-03-08

## 2022-03-09 RX ORDER — TRAMADOL HYDROCHLORIDE 50 MG/1
TABLET ORAL
Qty: 15 TABLET | Refills: 0 | Status: SHIPPED | OUTPATIENT
Start: 2022-03-09

## 2022-03-16 ENCOUNTER — HOSPITAL ENCOUNTER (EMERGENCY)
Age: 57
Discharge: HOME | End: 2022-03-16
Attending: EMERGENCY MEDICINE
Payer: COMMERCIAL

## 2022-03-16 ENCOUNTER — APPOINTMENT (OUTPATIENT)
Dept: GENERAL RADIOLOGY | Age: 57
End: 2022-03-16
Attending: PHYSICIAN ASSISTANT
Payer: COMMERCIAL

## 2022-03-16 VITALS
RESPIRATION RATE: 16 BRPM | DIASTOLIC BLOOD PRESSURE: 80 MMHG | OXYGEN SATURATION: 97 % | BODY MASS INDEX: 44 KG/M2 | WEIGHT: 264.56 LBS | TEMPERATURE: 97 F | SYSTOLIC BLOOD PRESSURE: 146 MMHG | HEART RATE: 84 BPM

## 2022-03-16 DIAGNOSIS — S60.222A CONTUSION OF LEFT HAND, INITIAL ENCOUNTER: Primary | ICD-10-CM

## 2022-03-16 PROCEDURE — 29125 APPL SHORT ARM SPLINT STATIC: CPT

## 2022-03-16 PROCEDURE — 99283 EMERGENCY DEPT VISIT LOW MDM: CPT

## 2022-03-16 PROCEDURE — 99284 EMERGENCY DEPT VISIT MOD MDM: CPT

## 2022-03-16 PROCEDURE — 73130 X-RAY EXAM OF HAND: CPT | Performed by: PHYSICIAN ASSISTANT

## 2022-03-27 ENCOUNTER — PATIENT MESSAGE (OUTPATIENT)
Dept: FAMILY MEDICINE CLINIC | Facility: CLINIC | Age: 57
End: 2022-03-27

## 2022-03-28 NOTE — TELEPHONE ENCOUNTER
From: Marlon Flynn  To: Ayde Ames MD  Sent: 3/27/2022 7:41 AM CDT  Subject: Re read X-ray    Dr. Aiyana Paulino,  I took a fall on 3/15 injuring my left hand. due to the significant swelling, bruising & pain I went to the ER on [address] I am over a week out now. The swelling is down quite a bit but still present in the 5th metacarpal area. The pain remains tender to touch & can not grasp or put pressure down in that area. I did not follow up with ortho since I was told no fracture. I have been wearing a \"boxer's\" brace/splint. I was wondering how I can go about having this X-ray re-read? In the report it indicates looking @ the 1st digit. That is NOT were I indicated the pain. It is the 5th digit between the base & the head metacarpal (I have attached a picture & circled where it is painful).     Thank you,  Lord Floyd

## 2022-03-29 ENCOUNTER — OFFICE VISIT (OUTPATIENT)
Dept: FAMILY MEDICINE CLINIC | Facility: CLINIC | Age: 57
End: 2022-03-29
Payer: COMMERCIAL

## 2022-03-29 VITALS
HEIGHT: 63 IN | HEART RATE: 92 BPM | BODY MASS INDEX: 47 KG/M2 | SYSTOLIC BLOOD PRESSURE: 120 MMHG | DIASTOLIC BLOOD PRESSURE: 82 MMHG | RESPIRATION RATE: 16 BRPM | OXYGEN SATURATION: 98 %

## 2022-03-29 DIAGNOSIS — T14.90XA: ICD-10-CM

## 2022-03-29 DIAGNOSIS — S60.222D CONTUSION OF LEFT HAND, SUBSEQUENT ENCOUNTER: Primary | ICD-10-CM

## 2022-03-29 PROCEDURE — 99214 OFFICE O/P EST MOD 30 MIN: CPT | Performed by: FAMILY MEDICINE

## 2022-03-29 PROCEDURE — 3079F DIAST BP 80-89 MM HG: CPT | Performed by: FAMILY MEDICINE

## 2022-03-29 PROCEDURE — 3008F BODY MASS INDEX DOCD: CPT | Performed by: FAMILY MEDICINE

## 2022-03-29 PROCEDURE — 3074F SYST BP LT 130 MM HG: CPT | Performed by: FAMILY MEDICINE

## 2022-04-18 ENCOUNTER — HOSPITAL ENCOUNTER (OUTPATIENT)
Dept: MAMMOGRAPHY | Facility: HOSPITAL | Age: 57
Discharge: HOME | End: 2022-04-18
Attending: INTERNAL MEDICINE
Payer: COMMERCIAL

## 2022-04-18 DIAGNOSIS — D05.11 DUCTAL CARCINOMA IN SITU (DCIS) OF RIGHT BREAST: ICD-10-CM

## 2022-04-18 PROCEDURE — 77062 BREAST TOMOSYNTHESIS BI: CPT | Performed by: INTERNAL MEDICINE

## 2022-04-18 PROCEDURE — 77066 DX MAMMO INCL CAD BI: CPT | Performed by: INTERNAL MEDICINE

## 2022-04-25 ENCOUNTER — OFFICE VISIT (OUTPATIENT)
Dept: FAMILY MEDICINE CLINIC | Facility: CLINIC | Age: 57
End: 2022-04-25
Payer: COMMERCIAL

## 2022-04-25 VITALS
RESPIRATION RATE: 18 BRPM | TEMPERATURE: 98 F | SYSTOLIC BLOOD PRESSURE: 130 MMHG | WEIGHT: 230 LBS | HEIGHT: 63 IN | DIASTOLIC BLOOD PRESSURE: 72 MMHG | OXYGEN SATURATION: 97 % | BODY MASS INDEX: 40.75 KG/M2 | HEART RATE: 86 BPM

## 2022-04-25 DIAGNOSIS — R39.9 UTI SYMPTOMS: Primary | ICD-10-CM

## 2022-04-25 LAB
BILIRUBIN: NEGATIVE
GLUCOSE (URINE DIPSTICK): NEGATIVE MG/DL
KETONES (URINE DIPSTICK): NEGATIVE MG/DL
MULTISTIX LOT#: ABNORMAL NUMERIC
NITRITE, URINE: NEGATIVE
PH, URINE: 6.5 (ref 4.5–8)
PROTEIN (URINE DIPSTICK): 30 MG/DL
SPECIFIC GRAVITY: 1.02 (ref 1–1.03)
URINE-COLOR: YELLOW
UROBILINOGEN,SEMI-QN: 0.2 MG/DL (ref 0–1.9)

## 2022-04-25 PROCEDURE — 87086 URINE CULTURE/COLONY COUNT: CPT | Performed by: NURSE PRACTITIONER

## 2022-04-25 RX ORDER — CEPHALEXIN 500 MG/1
500 CAPSULE ORAL 2 TIMES DAILY
Qty: 14 CAPSULE | Refills: 0 | Status: SHIPPED | OUTPATIENT
Start: 2022-04-25 | End: 2022-05-02

## 2022-05-26 DIAGNOSIS — G89.29 CHRONIC PAIN OF RIGHT KNEE: ICD-10-CM

## 2022-05-26 DIAGNOSIS — M25.561 CHRONIC PAIN OF RIGHT KNEE: ICD-10-CM

## 2022-05-26 DIAGNOSIS — M22.2X2 PATELLOFEMORAL ARTHRALGIA OF BOTH KNEES: ICD-10-CM

## 2022-05-26 DIAGNOSIS — M22.2X1 PATELLOFEMORAL ARTHRALGIA OF BOTH KNEES: ICD-10-CM

## 2022-05-26 DIAGNOSIS — M17.11 OSTEOARTHRITIS OF RIGHT KNEE, UNSPECIFIED OSTEOARTHRITIS TYPE: ICD-10-CM

## 2022-05-27 NOTE — TELEPHONE ENCOUNTER
Medication requested: meloxicam 15mg  Last OV: 5/19/21  Last refill date: 9/20/21  #/refills: 30/11 (rx expires every 6 months)  Upcoming appt: No future appointments.       RX pended for review and approval

## 2022-05-29 RX ORDER — MELOXICAM 15 MG/1
TABLET ORAL
Qty: 102 TABLET | Refills: 2 | Status: SHIPPED | OUTPATIENT
Start: 2022-05-29

## 2022-07-19 ENCOUNTER — OFFICE VISIT (OUTPATIENT)
Dept: FAMILY MEDICINE CLINIC | Facility: CLINIC | Age: 57
End: 2022-07-19
Payer: COMMERCIAL

## 2022-07-19 VITALS
BODY MASS INDEX: 47.84 KG/M2 | OXYGEN SATURATION: 99 % | SYSTOLIC BLOOD PRESSURE: 120 MMHG | DIASTOLIC BLOOD PRESSURE: 80 MMHG | HEIGHT: 63 IN | HEART RATE: 84 BPM | RESPIRATION RATE: 16 BRPM | WEIGHT: 270 LBS

## 2022-07-19 DIAGNOSIS — R60.9 PERIPHERAL EDEMA: ICD-10-CM

## 2022-07-19 DIAGNOSIS — E89.0 POSTABLATIVE HYPOTHYROIDISM: Primary | ICD-10-CM

## 2022-07-19 PROBLEM — J40 BRONCHITIS: Status: RESOLVED | Noted: 2019-08-07 | Resolved: 2022-07-19

## 2022-07-19 PROBLEM — Z20.822 CLOSE EXPOSURE TO COVID-19 VIRUS: Status: RESOLVED | Noted: 2020-04-23 | Resolved: 2022-07-19

## 2022-07-19 PROCEDURE — 3074F SYST BP LT 130 MM HG: CPT | Performed by: FAMILY MEDICINE

## 2022-07-19 PROCEDURE — 3008F BODY MASS INDEX DOCD: CPT | Performed by: FAMILY MEDICINE

## 2022-07-19 PROCEDURE — 99214 OFFICE O/P EST MOD 30 MIN: CPT | Performed by: FAMILY MEDICINE

## 2022-07-19 PROCEDURE — 3079F DIAST BP 80-89 MM HG: CPT | Performed by: FAMILY MEDICINE

## 2022-07-20 LAB
ALBUMIN/GLOBULIN RATIO: 1.5 (CALC) (ref 1–2.5)
ALBUMIN: 4.2 G/DL (ref 3.6–5.1)
ALKALINE PHOSPHATASE: 63 U/L (ref 37–153)
ALT: 13 U/L (ref 6–29)
AST: 17 U/L (ref 10–35)
BILIRUBIN, TOTAL: 0.4 MG/DL (ref 0.2–1.2)
BUN: 15 MG/DL (ref 7–25)
CALCIUM: 9.6 MG/DL (ref 8.6–10.4)
CARBON DIOXIDE: 29 MMOL/L (ref 20–32)
CHLORIDE: 103 MMOL/L (ref 98–110)
CREATININE: 0.73 MG/DL (ref 0.5–1.03)
EGFR: 98 ML/MIN/1.73M2
GLOBULIN: 2.8 G/DL (CALC) (ref 1.9–3.7)
GLUCOSE: 86 MG/DL (ref 65–99)
POTASSIUM: 4.1 MMOL/L (ref 3.5–5.3)
PROTEIN, TOTAL: 7 G/DL (ref 6.1–8.1)
SED RATE BY MODIFIED$WESTERGREN: 11 MM/H
SODIUM: 141 MMOL/L (ref 135–146)
T4, FREE: 1.2 NG/DL (ref 0.8–1.8)
TSH: 2.27 MIU/L
URIC ACID: 4.8 MG/DL (ref 2.5–7)

## 2022-07-25 ENCOUNTER — PATIENT MESSAGE (OUTPATIENT)
Dept: FAMILY MEDICINE CLINIC | Facility: CLINIC | Age: 57
End: 2022-07-25

## 2022-07-25 ENCOUNTER — TELEPHONE (OUTPATIENT)
Dept: FAMILY MEDICINE CLINIC | Facility: CLINIC | Age: 57
End: 2022-07-25

## 2022-07-25 RX ORDER — BUMETANIDE 0.5 MG/1
0.5 TABLET ORAL DAILY
Qty: 30 TABLET | Refills: 2 | Status: SHIPPED | OUTPATIENT
Start: 2022-07-25

## 2022-07-25 NOTE — TELEPHONE ENCOUNTER
From: Kashif David  To: Lorenza Gallego  Sent: 7/25/2022 10:07 AM CDT  Subject: Pepe Damon,  Dr Abhishek Alston has reviewed your labs and has found them all to be normal. He feels that the swelling in your legs could be possible weight gain. He has asked that you try to become more active, walking 30 minutes 3 days a week for instance, can help with mobilizing the fluid as well as great aerobic exercise. Also, try to follow a low fat, low carbohydrate diet. Concentrating on more fresh fruit, vegetables and fish and chicken, avoiding fried and processed foods.  Sincerely, Ingris Drake RN

## 2022-07-25 NOTE — TELEPHONE ENCOUNTER
----- Message from Deandre Dacosta MD sent at 7/25/2022  9:38 AM CDT -----  Normal labs, ok to watch leg swelling for now.

## 2022-08-30 ENCOUNTER — APPOINTMENT (OUTPATIENT)
Dept: GENERAL RADIOLOGY | Age: 57
End: 2022-08-30
Payer: COMMERCIAL

## 2022-08-30 ENCOUNTER — HOSPITAL ENCOUNTER (EMERGENCY)
Age: 57
Discharge: HOME | End: 2022-08-30
Payer: COMMERCIAL

## 2022-08-30 VITALS
OXYGEN SATURATION: 97 % | HEIGHT: 63 IN | DIASTOLIC BLOOD PRESSURE: 86 MMHG | BODY MASS INDEX: 46.07 KG/M2 | HEART RATE: 65 BPM | RESPIRATION RATE: 17 BRPM | WEIGHT: 260 LBS | SYSTOLIC BLOOD PRESSURE: 138 MMHG | TEMPERATURE: 98 F

## 2022-08-30 DIAGNOSIS — S63.642A SPRAIN OF METACARPOPHALANGEAL (MCP) JOINT OF LEFT THUMB, INITIAL ENCOUNTER: Primary | ICD-10-CM

## 2022-08-30 PROCEDURE — 99283 EMERGENCY DEPT VISIT LOW MDM: CPT

## 2022-08-30 PROCEDURE — 73140 X-RAY EXAM OF FINGER(S): CPT

## 2022-09-15 ENCOUNTER — PATIENT MESSAGE (OUTPATIENT)
Dept: FAMILY MEDICINE CLINIC | Facility: CLINIC | Age: 57
End: 2022-09-15

## 2022-09-15 DIAGNOSIS — M17.11 OSTEOARTHRITIS OF RIGHT KNEE, UNSPECIFIED OSTEOARTHRITIS TYPE: ICD-10-CM

## 2022-09-15 DIAGNOSIS — M22.2X1 PATELLOFEMORAL ARTHRALGIA OF BOTH KNEES: ICD-10-CM

## 2022-09-15 DIAGNOSIS — M22.2X2 PATELLOFEMORAL ARTHRALGIA OF BOTH KNEES: ICD-10-CM

## 2022-09-15 DIAGNOSIS — M25.561 CHRONIC PAIN OF RIGHT KNEE: ICD-10-CM

## 2022-09-15 DIAGNOSIS — G89.29 CHRONIC PAIN OF RIGHT KNEE: ICD-10-CM

## 2022-09-15 RX ORDER — MELOXICAM 15 MG/1
15 TABLET ORAL DAILY PRN
Qty: 102 TABLET | Refills: 2 | Status: CANCELLED | OUTPATIENT
Start: 2022-09-15

## 2022-09-15 RX ORDER — LEVOTHYROXINE SODIUM 112 UG/1
112 TABLET ORAL EVERY MORNING
Qty: 102 TABLET | Refills: 0 | Status: SHIPPED | OUTPATIENT
Start: 2022-09-15

## 2022-09-15 NOTE — TELEPHONE ENCOUNTER
Pt's  is changing jobs and not sure when new insurance will be effective. She is requesting a 102 day supply of levothyroxine. This was last refilled for a year on 1/14 but this was to mail order. Med pended below, please approve or deny, thanks. Refill for Meloxicam pending w/ ortho-.

## 2022-09-15 NOTE — TELEPHONE ENCOUNTER
From: Krystle Echeverria  To: Marguerite Pillai MD  Sent: 9/15/2022 9:23 AM CDT  Subject: Refill on Meloxicam & Levothyroxine    Dr. Barbara Guardado,  My  is changing jobs & concerned about a couple prescriptions since have not been provided new insurance effective date. Needing . .. Meloxicam 15mg (102 day supply)  Levothyroxine 112mcg (102 day supply)  Please call in to [facility] in [place] on [address]. Thank you!   Kathya Campos

## 2022-09-20 DIAGNOSIS — M22.2X2 PATELLOFEMORAL ARTHRALGIA OF BOTH KNEES: ICD-10-CM

## 2022-09-20 DIAGNOSIS — M17.11 OSTEOARTHRITIS OF RIGHT KNEE, UNSPECIFIED OSTEOARTHRITIS TYPE: ICD-10-CM

## 2022-09-20 DIAGNOSIS — G89.29 CHRONIC PAIN OF RIGHT KNEE: ICD-10-CM

## 2022-09-20 DIAGNOSIS — M22.2X1 PATELLOFEMORAL ARTHRALGIA OF BOTH KNEES: ICD-10-CM

## 2022-09-20 DIAGNOSIS — M25.561 CHRONIC PAIN OF RIGHT KNEE: ICD-10-CM

## 2022-09-21 RX ORDER — MELOXICAM 15 MG/1
15 TABLET ORAL DAILY PRN
Qty: 30 TABLET | Refills: 0 | Status: SHIPPED | OUTPATIENT
Start: 2022-09-21 | End: 2022-10-21

## 2022-09-29 RX ORDER — LEVOTHYROXINE SODIUM 112 UG/1
TABLET ORAL
Qty: 102 TABLET | Refills: 2 | Status: SHIPPED | OUTPATIENT
Start: 2022-09-29

## 2022-10-10 RX ORDER — MONTELUKAST SODIUM 10 MG/1
TABLET ORAL
Qty: 30 TABLET | Refills: 0 | Status: SHIPPED | OUTPATIENT
Start: 2022-10-10

## 2022-10-10 RX ORDER — BUMETANIDE 0.5 MG/1
TABLET ORAL
Qty: 30 TABLET | Refills: 0 | Status: SHIPPED | OUTPATIENT
Start: 2022-10-10

## 2022-10-17 ENCOUNTER — PATIENT MESSAGE (OUTPATIENT)
Dept: FAMILY MEDICINE CLINIC | Facility: CLINIC | Age: 57
End: 2022-10-17

## 2022-10-17 DIAGNOSIS — M22.2X2 PATELLOFEMORAL ARTHRALGIA OF BOTH KNEES: ICD-10-CM

## 2022-10-17 DIAGNOSIS — M22.2X1 PATELLOFEMORAL ARTHRALGIA OF BOTH KNEES: ICD-10-CM

## 2022-10-17 DIAGNOSIS — E89.0 POSTABLATIVE HYPOTHYROIDISM: Primary | ICD-10-CM

## 2022-10-17 DIAGNOSIS — G89.29 CHRONIC PAIN OF RIGHT KNEE: ICD-10-CM

## 2022-10-17 DIAGNOSIS — M25.561 CHRONIC PAIN OF RIGHT KNEE: ICD-10-CM

## 2022-10-17 DIAGNOSIS — M17.11 OSTEOARTHRITIS OF RIGHT KNEE, UNSPECIFIED OSTEOARTHRITIS TYPE: ICD-10-CM

## 2022-10-17 NOTE — TELEPHONE ENCOUNTER
Pt requesting refills for Levothyroxine and Meloxicam. Orders pended as pt. Also on escitalopram. LOV 7/19/22.

## 2022-10-18 RX ORDER — LEVOTHYROXINE SODIUM 112 UG/1
112 TABLET ORAL EVERY MORNING
Qty: 90 TABLET | Refills: 1 | Status: SHIPPED | OUTPATIENT
Start: 2022-10-18

## 2022-10-18 RX ORDER — MELOXICAM 15 MG/1
15 TABLET ORAL DAILY PRN
Qty: 30 TABLET | Refills: 2 | Status: SHIPPED | OUTPATIENT
Start: 2022-10-18

## 2022-11-04 RX ORDER — BUMETANIDE 0.5 MG/1
0.5 TABLET ORAL DAILY
Qty: 30 TABLET | Refills: 0 | Status: SHIPPED | OUTPATIENT
Start: 2022-11-04

## 2022-11-08 ENCOUNTER — LAB ENCOUNTER (OUTPATIENT)
Dept: LAB | Age: 57
End: 2022-11-08
Attending: ORTHOPAEDIC SURGERY
Payer: COMMERCIAL

## 2022-11-08 DIAGNOSIS — Z20.822 ENCOUNTER FOR PREOPERATIVE SCREENING LABORATORY TESTING FOR COVID-19 VIRUS: ICD-10-CM

## 2022-11-08 DIAGNOSIS — Z01.812 ENCOUNTER FOR PREOPERATIVE SCREENING LABORATORY TESTING FOR COVID-19 VIRUS: ICD-10-CM

## 2022-11-09 LAB — SARS-COV-2 RNA RESP QL NAA+PROBE: NOT DETECTED

## 2022-11-10 ENCOUNTER — HOSPITAL ENCOUNTER (OUTPATIENT)
Facility: HOSPITAL | Age: 57
Setting detail: HOSPITAL OUTPATIENT SURGERY
Discharge: HOME | End: 2022-11-10
Attending: ORTHOPAEDIC SURGERY | Admitting: ORTHOPAEDIC SURGERY
Payer: COMMERCIAL

## 2022-11-10 ENCOUNTER — ANESTHESIA EVENT (OUTPATIENT)
Dept: SURGERY | Facility: HOSPITAL | Age: 57
End: 2022-11-10
Payer: COMMERCIAL

## 2022-11-10 ENCOUNTER — ANESTHESIA (OUTPATIENT)
Dept: SURGERY | Facility: HOSPITAL | Age: 57
End: 2022-11-10
Payer: COMMERCIAL

## 2022-11-10 VITALS
TEMPERATURE: 98 F | OXYGEN SATURATION: 93 % | SYSTOLIC BLOOD PRESSURE: 137 MMHG | RESPIRATION RATE: 20 BRPM | DIASTOLIC BLOOD PRESSURE: 83 MMHG | WEIGHT: 272 LBS | HEIGHT: 63 IN | BODY MASS INDEX: 48.2 KG/M2 | HEART RATE: 92 BPM

## 2022-11-10 DIAGNOSIS — Z20.822 ENCOUNTER FOR PREOPERATIVE SCREENING LABORATORY TESTING FOR COVID-19 VIRUS: Primary | ICD-10-CM

## 2022-11-10 DIAGNOSIS — Z01.812 ENCOUNTER FOR PREOPERATIVE SCREENING LABORATORY TESTING FOR COVID-19 VIRUS: Primary | ICD-10-CM

## 2022-11-10 PROCEDURE — 0MQ80ZZ REPAIR LEFT HAND BURSA AND LIGAMENT, OPEN APPROACH: ICD-10-PCS | Performed by: ORTHOPAEDIC SURGERY

## 2022-11-10 DEVICE — DX SWIVELOCK SL, 3.5X8.5MM W/FORK EYELET
Type: IMPLANTABLE DEVICE | Status: FUNCTIONAL
Brand: ARTHREX®

## 2022-11-10 DEVICE — IMPLANTABLE DEVICE: Type: IMPLANTABLE DEVICE | Site: THUMB | Status: FUNCTIONAL

## 2022-11-10 RX ORDER — ONDANSETRON 2 MG/ML
INJECTION INTRAMUSCULAR; INTRAVENOUS AS NEEDED
Status: DISCONTINUED | OUTPATIENT
Start: 2022-11-10 | End: 2022-11-10 | Stop reason: SURG

## 2022-11-10 RX ORDER — HYDROCODONE BITARTRATE AND ACETAMINOPHEN 10; 325 MG/1; MG/1
2 TABLET ORAL ONCE AS NEEDED
Status: COMPLETED | OUTPATIENT
Start: 2022-11-10 | End: 2022-11-10

## 2022-11-10 RX ORDER — HYDROMORPHONE HYDROCHLORIDE 1 MG/ML
0.4 INJECTION, SOLUTION INTRAMUSCULAR; INTRAVENOUS; SUBCUTANEOUS EVERY 5 MIN PRN
Status: DISCONTINUED | OUTPATIENT
Start: 2022-11-10 | End: 2022-11-10

## 2022-11-10 RX ORDER — PROCHLORPERAZINE EDISYLATE 5 MG/ML
5 INJECTION INTRAMUSCULAR; INTRAVENOUS EVERY 8 HOURS PRN
Status: DISCONTINUED | OUTPATIENT
Start: 2022-11-10 | End: 2022-11-10

## 2022-11-10 RX ORDER — SODIUM CHLORIDE, SODIUM LACTATE, POTASSIUM CHLORIDE, CALCIUM CHLORIDE 600; 310; 30; 20 MG/100ML; MG/100ML; MG/100ML; MG/100ML
INJECTION, SOLUTION INTRAVENOUS CONTINUOUS
Status: DISCONTINUED | OUTPATIENT
Start: 2022-11-10 | End: 2022-11-10

## 2022-11-10 RX ORDER — LABETALOL HYDROCHLORIDE 5 MG/ML
5 INJECTION, SOLUTION INTRAVENOUS EVERY 5 MIN PRN
Status: DISCONTINUED | OUTPATIENT
Start: 2022-11-10 | End: 2022-11-10

## 2022-11-10 RX ORDER — BUPIVACAINE HYDROCHLORIDE 5 MG/ML
INJECTION, SOLUTION EPIDURAL; INTRACAUDAL AS NEEDED
Status: DISCONTINUED | OUTPATIENT
Start: 2022-11-10 | End: 2022-11-10 | Stop reason: HOSPADM

## 2022-11-10 RX ORDER — DIPHENHYDRAMINE HYDROCHLORIDE 50 MG/ML
12.5 INJECTION INTRAMUSCULAR; INTRAVENOUS AS NEEDED
Status: DISCONTINUED | OUTPATIENT
Start: 2022-11-10 | End: 2022-11-10

## 2022-11-10 RX ORDER — MIDAZOLAM HYDROCHLORIDE 1 MG/ML
1 INJECTION INTRAMUSCULAR; INTRAVENOUS EVERY 5 MIN PRN
Status: DISCONTINUED | OUTPATIENT
Start: 2022-11-10 | End: 2022-11-10

## 2022-11-10 RX ORDER — CEFAZOLIN SODIUM/WATER 2 G/20 ML
SYRINGE (ML) INTRAVENOUS
Status: DISCONTINUED
Start: 2022-11-10 | End: 2022-11-10

## 2022-11-10 RX ORDER — KETOROLAC TROMETHAMINE 30 MG/ML
INJECTION, SOLUTION INTRAMUSCULAR; INTRAVENOUS AS NEEDED
Status: DISCONTINUED | OUTPATIENT
Start: 2022-11-10 | End: 2022-11-10 | Stop reason: SURG

## 2022-11-10 RX ORDER — HYDROMORPHONE HYDROCHLORIDE 1 MG/ML
0.6 INJECTION, SOLUTION INTRAMUSCULAR; INTRAVENOUS; SUBCUTANEOUS EVERY 5 MIN PRN
Status: DISCONTINUED | OUTPATIENT
Start: 2022-11-10 | End: 2022-11-10

## 2022-11-10 RX ORDER — ONDANSETRON 2 MG/ML
4 INJECTION INTRAMUSCULAR; INTRAVENOUS EVERY 6 HOURS PRN
Status: DISCONTINUED | OUTPATIENT
Start: 2022-11-10 | End: 2022-11-10

## 2022-11-10 RX ORDER — NALOXONE HYDROCHLORIDE 0.4 MG/ML
80 INJECTION, SOLUTION INTRAMUSCULAR; INTRAVENOUS; SUBCUTANEOUS AS NEEDED
Status: DISCONTINUED | OUTPATIENT
Start: 2022-11-10 | End: 2022-11-10

## 2022-11-10 RX ORDER — ACETAMINOPHEN 500 MG
1000 TABLET ORAL ONCE
Status: DISCONTINUED | OUTPATIENT
Start: 2022-11-10 | End: 2022-11-10 | Stop reason: HOSPADM

## 2022-11-10 RX ORDER — ACETAMINOPHEN 500 MG
1000 TABLET ORAL ONCE AS NEEDED
Status: COMPLETED | OUTPATIENT
Start: 2022-11-10 | End: 2022-11-10

## 2022-11-10 RX ORDER — CEFAZOLIN SODIUM IN 0.9 % NACL 3 G/100 ML
3 INTRAVENOUS SOLUTION, PIGGYBACK (ML) INTRAVENOUS ONCE
Status: DISCONTINUED | OUTPATIENT
Start: 2022-11-10 | End: 2022-11-10 | Stop reason: HOSPADM

## 2022-11-10 RX ORDER — LIDOCAINE HYDROCHLORIDE 10 MG/ML
INJECTION, SOLUTION EPIDURAL; INFILTRATION; INTRACAUDAL; PERINEURAL AS NEEDED
Status: DISCONTINUED | OUTPATIENT
Start: 2022-11-10 | End: 2022-11-10 | Stop reason: SURG

## 2022-11-10 RX ORDER — CEFAZOLIN SODIUM/WATER 2 G/20 ML
2 SYRINGE (ML) INTRAVENOUS ONCE
Status: DISCONTINUED | OUTPATIENT
Start: 2022-11-10 | End: 2022-11-10

## 2022-11-10 RX ORDER — DEXAMETHASONE SODIUM PHOSPHATE 4 MG/ML
VIAL (ML) INJECTION AS NEEDED
Status: DISCONTINUED | OUTPATIENT
Start: 2022-11-10 | End: 2022-11-10 | Stop reason: SURG

## 2022-11-10 RX ORDER — SCOLOPAMINE TRANSDERMAL SYSTEM 1 MG/1
1 PATCH, EXTENDED RELEASE TRANSDERMAL ONCE
Status: DISCONTINUED | OUTPATIENT
Start: 2022-11-10 | End: 2022-11-10 | Stop reason: HOSPADM

## 2022-11-10 RX ORDER — HYDROCODONE BITARTRATE AND ACETAMINOPHEN 10; 325 MG/1; MG/1
1 TABLET ORAL ONCE AS NEEDED
Status: COMPLETED | OUTPATIENT
Start: 2022-11-10 | End: 2022-11-10

## 2022-11-10 RX ORDER — MEPERIDINE HYDROCHLORIDE 25 MG/ML
12.5 INJECTION INTRAMUSCULAR; INTRAVENOUS; SUBCUTANEOUS AS NEEDED
Status: DISCONTINUED | OUTPATIENT
Start: 2022-11-10 | End: 2022-11-10

## 2022-11-10 RX ORDER — ALBUTEROL SULFATE 2.5 MG/3ML
2.5 SOLUTION RESPIRATORY (INHALATION) AS NEEDED
Status: DISCONTINUED | OUTPATIENT
Start: 2022-11-10 | End: 2022-11-10

## 2022-11-10 RX ORDER — HYDROCODONE BITARTRATE AND ACETAMINOPHEN 5; 325 MG/1; MG/1
1 TABLET ORAL EVERY 6 HOURS PRN
Qty: 10 TABLET | Refills: 0 | Status: SHIPPED | OUTPATIENT
Start: 2022-11-10

## 2022-11-10 RX ORDER — HYDROMORPHONE HYDROCHLORIDE 1 MG/ML
0.2 INJECTION, SOLUTION INTRAMUSCULAR; INTRAVENOUS; SUBCUTANEOUS EVERY 5 MIN PRN
Status: DISCONTINUED | OUTPATIENT
Start: 2022-11-10 | End: 2022-11-10

## 2022-11-10 RX ADMIN — SODIUM CHLORIDE, SODIUM LACTATE, POTASSIUM CHLORIDE, CALCIUM CHLORIDE: 600; 310; 30; 20 INJECTION, SOLUTION INTRAVENOUS at 17:46:00

## 2022-11-10 RX ADMIN — DEXAMETHASONE SODIUM PHOSPHATE 8 MG: 4 MG/ML VIAL (ML) INJECTION at 16:24:00

## 2022-11-10 RX ADMIN — KETOROLAC TROMETHAMINE 30 MG: 30 INJECTION, SOLUTION INTRAMUSCULAR; INTRAVENOUS at 17:47:00

## 2022-11-10 RX ADMIN — SODIUM CHLORIDE, SODIUM LACTATE, POTASSIUM CHLORIDE, CALCIUM CHLORIDE: 600; 310; 30; 20 INJECTION, SOLUTION INTRAVENOUS at 16:14:00

## 2022-11-10 RX ADMIN — ONDANSETRON 4 MG: 2 INJECTION INTRAMUSCULAR; INTRAVENOUS at 17:20:00

## 2022-11-10 RX ADMIN — LIDOCAINE HYDROCHLORIDE 50 MG: 10 INJECTION, SOLUTION EPIDURAL; INFILTRATION; INTRACAUDAL; PERINEURAL at 16:18:00

## 2022-11-10 NOTE — ANESTHESIA POSTPROCEDURE EVALUATION
[address] Patient Status:  Hospital Outpatient Surgery   Age/Gender 49year old female MRN [id number]   [facility] SURGERY Attending Chary Allison MD   Hosp Day # 0 PCP Dana Espinal MD       Anesthesia Post-op Note    REPAIR OF THE LEFT THUMB ULNAR COLLATERAL LIGAMENT    Procedure Summary     Date: 11/10/22 Room / Location: [facility]4 [facility] OR 19 / 1404 [facility] OR    Anesthesia Start: 3734 Anesthesia Stop:     Procedure: REPAIR OF THE LEFT THUMB ULNAR COLLATERAL LIGAMENT (Left: Hand) Diagnosis: (SKIER'S THUMB LEFT, INITIAL ENCOUNTER)    Surgeons: Chary Allison MD Anesthesiologist: Hugo Dennison MD    Anesthesia Type: general ASA Status: 3          Anesthesia Type: general    Vitals Value Taken Time   /74 11/10/22 1753   Temp 97.1 degrees F 11/10/22 1753   Pulse 96 11/10/22 1753   Resp 12 11/10/22 1753   SpO2 98% 11/10/22 1753       Patient Location: PACU    Anesthesia Type: general    Airway Patency: patent and extubated    Postop Pain Control: adequate    Mental Status: mildly sedated but able to meaningfully participate in the post-anesthesia evaluation    Nausea/Vomiting: none    Cardiopulmonary/Hydration status: stable euvolemic    Complications: no apparent anesthesia related complications    Postop vital signs: stable    Dental Exam: Unchanged from Preop    Patient to be discharged from PACU when criteria met.

## 2022-11-10 NOTE — ANESTHESIA PROCEDURE NOTES
Airway  Date/Time: 11/10/2022 4:20 PM  Urgency: elective    Airway not difficult    General Information and Staff    Patient location during procedure: OR  Anesthesiologist: Radha Beckford MD  Performed: anesthesiologist     Indications and Patient Condition  Indications for airway management: anesthesia  Sedation level: deep  Preoxygenated: yes  Patient position: sniffing  Mask difficulty assessment: 1 - vent by mask    Final Airway Details  Final airway type: supraglottic airway      Successful airway: classic  Size 3       Number of attempts at approach: 1

## 2022-11-10 NOTE — DISCHARGE INSTRUCTIONS
Move fingers often  Elevate and ice the extremity  Keep dressings in place, clean and dry  Return to clinic next week    Next Motrin dose at or after 11:45pm

## 2022-11-11 NOTE — BRIEF OP NOTE
Pre-Operative Diagnosis: SKIER'S THUMB LEFT, INITIAL ENCOUNTER     Post-Operative Diagnosis: SKIER'S THUMB LEFT, INITIAL ENCOUNTER      Procedure Performed:   REPAIR OF THE LEFT THUMB ULNAR COLLATERAL LIGAMENT    Surgeon(s) and Role:     Miranda Forrester MD - Primary    Assistant(s):        Surgical Findings: c/w dx     Specimen: none     Estimated Blood Loss: Blood Output: 3 mL (11/10/2022  5:41 PM)      Dictation Number:       Lynn Santoro MD  11/10/2022  6:01 PM

## 2022-11-11 NOTE — OPERATIVE REPORT
[facility]    PATIENT'S NAME: Bharat Beyer   ATTENDING PHYSICIAN: Gilberto Girard M.D. OPERATING PHYSICIAN: Gilberto Girard M.D. PATIENT ACCOUNT#:   [de-identified]    LOCATION:  [place] [address]  MEDICAL RECORD #:   [id number]       YOB: 1965  ADMISSION DATE:       11/10/2022      OPERATION DATE:  11/10/2022    OPERATIVE REPORT      PREOPERATIVE DIAGNOSIS:  Left thumb metacarpophalangeal joint ulnar collateral ligament rupture. POSTOPERATIVE DIAGNOSIS:  Left thumb metacarpophalangeal joint ulnar collateral ligament rupture. PROCEDURE:  Left thumb metacarpophalangeal joint ulnar collateral ligament repair. ANESTHESIA:  General.    ESTIMATED BLOOD LOSS:  Minimal.    TOURNIQUET TIME:  Please see OR records. SPECIMENS:  None. COMPLICATIONS:  None. DISPOSITION:  Fair condition to the recovery room. PLAN:  Patient can begin range of motion exercises in 6 weeks. INDICATIONS:  The patient is a 22-year-old female who had sustained a rupture of the ulnar collateral ligament. She was therefore offered surgical intervention. The risks and benefits of procedure were discussed in detail with the patient including risk of stiffness, chronic pain, skin healing problems, infection. She shows good understanding of the issues and wished to proceed with surgery. FINDINGS:  Patient with rupture of the ulnar collateral ligament from the proximal phalanx and also the metacarpal head. OPERATIVE TECHNIQUE:  On the date of operation, I saw the patient in the holding room and initialed the surgical site. The patient was taken to the operating room and was placed supine on the operating room table. General endotracheal anesthesia was smoothly initiated. Tourniquet was placed about the left biceps, and the left upper extremity was prepped and draped in standard surgical fashion. A surgical time-out was taken to confirm patient, surgical site and procedure were verified.   The limb was exsanguinated with an Esmarch, and tourniquet was inflated to 250 mmHg. The area surrounding the incision was infiltrated with a mixture of plain Marcaine and plain lidocaine. A curved S-shaped incision was performed over the ulnar aspect of the MCP joint of the thumb. Full-thickness skin flaps were raised. Dissection was carried down through the soft tissue and the extensor mechanism was visualized. The adductor aponeurosis was divided just volar to the extensor mechanism. This did reveal the base of the proximal phalanx. The ulnar collateral ligament was identified. This had ruptured from the base of the proximal phalanx and also partially ruptured from the head of the metacarpal at its insertion point. Of note, care was taken to preserve the superficial branch of the radial nerve that crossed the surgical plane. A looped FiberWire suture was then brought through the periosteum dorsal to the capsule and then through the proximal portion of the collateral ligament stump, and this was then looped back to secure this. Sutures were then woven through the collateral ligament. The Arthrex PushLock anchor was then placed at the distal volar aspect of the proximal phalanx and the sutures had been brought through the collateral ligament as well as FiberTape were then placed through the anchor at this level. A second hole was then drilled just proximal to the origin of the ulnar collateral ligament and the FiberTape was then tensioned bringing good stability to the MCP joint. The anchor was then placed proximally to complete the internal brace portion of the repair. The FiberWire suture had been woven back to the ligament and then was tied back to the FiberTape to complete the repair. Following this, good stability was noted and full range of motion of thumb was present. The wound was copiously irrigated, and the aponeurosis was repaired with a running 3-0 Vicryl suture.   Skin was closed with 3-0 Vicryl in the subcutaneous tissue followed by 4-0 subcuticular Monocryl stitch. Sterile bulky dressings and a thumb spica splint were applied. Tourniquet was released and the fingers pinked up nicely. The patient was awakened from anesthesia and was taken to the recovery room in fair condition. She will be sent home with postop written and oral instructions, as well as oral narcotics for postop pain. She will follow up in 1 week for a wound check.     Dictated By Ramila Mercado M.D.  d: 11/10/2022 18:04:12  t: 11/11/2022 04:22:52  Job [id number]  /

## 2022-12-06 ENCOUNTER — TELEPHONE (OUTPATIENT)
Dept: HEMATOLOGY/ONCOLOGY | Facility: HOSPITAL | Age: 57
End: 2022-12-06

## 2022-12-06 DIAGNOSIS — M17.11 OSTEOARTHRITIS OF RIGHT KNEE, UNSPECIFIED OSTEOARTHRITIS TYPE: ICD-10-CM

## 2022-12-06 DIAGNOSIS — G89.29 CHRONIC PAIN OF RIGHT KNEE: ICD-10-CM

## 2022-12-06 DIAGNOSIS — M25.561 CHRONIC PAIN OF RIGHT KNEE: ICD-10-CM

## 2022-12-06 DIAGNOSIS — M22.2X1 PATELLOFEMORAL ARTHRALGIA OF BOTH KNEES: ICD-10-CM

## 2022-12-06 DIAGNOSIS — D05.11 DUCTAL CARCINOMA IN SITU (DCIS) OF RIGHT BREAST: ICD-10-CM

## 2022-12-06 DIAGNOSIS — M22.2X2 PATELLOFEMORAL ARTHRALGIA OF BOTH KNEES: ICD-10-CM

## 2022-12-06 DIAGNOSIS — F41.1 GAD (GENERALIZED ANXIETY DISORDER): ICD-10-CM

## 2022-12-06 RX ORDER — MELOXICAM 15 MG/1
15 TABLET ORAL DAILY PRN
Qty: 30 TABLET | Refills: 2 | Status: SHIPPED | OUTPATIENT
Start: 2022-12-06

## 2022-12-06 RX ORDER — TAMOXIFEN CITRATE 20 MG/1
20 TABLET ORAL DAILY
Qty: 102 TABLET | Refills: 0 | Status: SHIPPED | OUTPATIENT
Start: 2022-12-06

## 2022-12-06 RX ORDER — ESCITALOPRAM OXALATE 10 MG/1
10 TABLET ORAL DAILY
Qty: 90 TABLET | Refills: 4 | Status: SHIPPED | OUTPATIENT
Start: 2022-12-06

## 2022-12-06 RX ORDER — MONTELUKAST SODIUM 10 MG/1
10 TABLET ORAL DAILY
Qty: 30 TABLET | Refills: 0 | Status: SHIPPED | OUTPATIENT
Start: 2022-12-06

## 2022-12-08 ENCOUNTER — TELEPHONE (OUTPATIENT)
Dept: HEMATOLOGY/ONCOLOGY | Facility: HOSPITAL | Age: 57
End: 2022-12-08

## 2022-12-23 ENCOUNTER — PATIENT MESSAGE (OUTPATIENT)
Dept: FAMILY MEDICINE CLINIC | Facility: CLINIC | Age: 57
End: 2022-12-23

## 2022-12-23 DIAGNOSIS — M17.11 OSTEOARTHRITIS OF RIGHT KNEE, UNSPECIFIED OSTEOARTHRITIS TYPE: ICD-10-CM

## 2022-12-23 DIAGNOSIS — M22.2X1 PATELLOFEMORAL ARTHRALGIA OF BOTH KNEES: ICD-10-CM

## 2022-12-23 DIAGNOSIS — M25.561 CHRONIC PAIN OF RIGHT KNEE: ICD-10-CM

## 2022-12-23 DIAGNOSIS — M22.2X2 PATELLOFEMORAL ARTHRALGIA OF BOTH KNEES: ICD-10-CM

## 2022-12-23 DIAGNOSIS — G89.29 CHRONIC PAIN OF RIGHT KNEE: ICD-10-CM

## 2022-12-23 NOTE — TELEPHONE ENCOUNTER
Pt requesting 3mo supply to [facility] for Montelukast and Meloxicam. LOV 7/19/22.   Orders pended for approval.

## 2022-12-23 NOTE — TELEPHONE ENCOUNTER
From: Joce Maravilla  To: Roopa Delgado MD  Sent: 12/23/2022 7:51 AM CST  Subject: Medications    Dr. Prakash Minaya,  Last refill request was for 102 day supply since going thru mail order. Only received 30 day supply. Could you please refill with 102 day supply to [facility] for   Montelukast & Meloxican?   Thank you

## 2022-12-27 RX ORDER — MONTELUKAST SODIUM 10 MG/1
10 TABLET ORAL DAILY
Qty: 102 TABLET | Refills: 0 | Status: SHIPPED | OUTPATIENT
Start: 2022-12-27

## 2022-12-27 RX ORDER — MELOXICAM 15 MG/1
15 TABLET ORAL DAILY PRN
Qty: 102 TABLET | Refills: 0 | Status: SHIPPED | OUTPATIENT
Start: 2022-12-27

## 2023-02-23 ENCOUNTER — PATIENT MESSAGE (OUTPATIENT)
Dept: FAMILY MEDICINE CLINIC | Facility: CLINIC | Age: 58
End: 2023-02-23

## 2023-02-23 DIAGNOSIS — Z00.00 ROUTINE GENERAL MEDICAL EXAMINATION AT A HEALTH CARE FACILITY: Primary | ICD-10-CM

## 2023-02-23 NOTE — TELEPHONE ENCOUNTER
Pt requesting lab orders for Hormone levels r/t Tamoxifen. Please advise on which labs need to be added to annual labs.

## 2023-03-03 DIAGNOSIS — D05.11 DUCTAL CARCINOMA IN SITU (DCIS) OF RIGHT BREAST: Primary | ICD-10-CM

## 2023-03-03 DIAGNOSIS — N91.2 AMENORRHEA: ICD-10-CM

## 2023-03-07 ENCOUNTER — PATIENT MESSAGE (OUTPATIENT)
Dept: FAMILY MEDICINE CLINIC | Facility: CLINIC | Age: 58
End: 2023-03-07

## 2023-03-07 DIAGNOSIS — M22.2X1 PATELLOFEMORAL ARTHRALGIA OF BOTH KNEES: ICD-10-CM

## 2023-03-07 DIAGNOSIS — M25.561 CHRONIC PAIN OF RIGHT KNEE: ICD-10-CM

## 2023-03-07 DIAGNOSIS — M17.11 OSTEOARTHRITIS OF RIGHT KNEE, UNSPECIFIED OSTEOARTHRITIS TYPE: ICD-10-CM

## 2023-03-07 DIAGNOSIS — M22.2X2 PATELLOFEMORAL ARTHRALGIA OF BOTH KNEES: ICD-10-CM

## 2023-03-07 DIAGNOSIS — G89.29 CHRONIC PAIN OF RIGHT KNEE: ICD-10-CM

## 2023-03-07 RX ORDER — MELOXICAM 15 MG/1
15 TABLET ORAL DAILY PRN
Qty: 30 TABLET | Refills: 0 | Status: SHIPPED | OUTPATIENT
Start: 2023-03-07 | End: 2023-03-07

## 2023-03-07 RX ORDER — MONTELUKAST SODIUM 10 MG/1
10 TABLET ORAL DAILY
Qty: 90 TABLET | Refills: 0 | Status: SHIPPED | OUTPATIENT
Start: 2023-03-07

## 2023-03-07 RX ORDER — MONTELUKAST SODIUM 10 MG/1
10 TABLET ORAL DAILY
Qty: 30 TABLET | Refills: 0 | Status: SHIPPED | OUTPATIENT
Start: 2023-03-07 | End: 2023-03-07

## 2023-03-07 RX ORDER — MELOXICAM 15 MG/1
15 TABLET ORAL DAILY PRN
Qty: 90 TABLET | Refills: 0 | Status: SHIPPED | OUTPATIENT
Start: 2023-03-07

## 2023-03-07 NOTE — TELEPHONE ENCOUNTER
Requesting refill on meloxicam and montelukast.   LOV 7/19/2022. Due for appointment. Appointment scheduled for 3/27/2023. Please approve or deny pending rx. Thank you.

## 2023-03-15 DIAGNOSIS — D05.11 DUCTAL CARCINOMA IN SITU (DCIS) OF RIGHT BREAST: ICD-10-CM

## 2023-03-15 RX ORDER — TAMOXIFEN CITRATE 20 MG/1
TABLET ORAL
Qty: 90 TABLET | Refills: 0 | Status: SHIPPED | OUTPATIENT
Start: 2023-03-15

## 2023-03-23 DIAGNOSIS — E89.0 POSTABLATIVE HYPOTHYROIDISM: ICD-10-CM

## 2023-03-23 RX ORDER — LEVOTHYROXINE SODIUM 112 UG/1
112 TABLET ORAL EVERY MORNING
Qty: 90 TABLET | Refills: 1 | Status: SHIPPED | OUTPATIENT
Start: 2023-03-23

## 2023-03-24 ENCOUNTER — LAB ENCOUNTER (OUTPATIENT)
Dept: LAB | Age: 58
End: 2023-03-24
Attending: FAMILY MEDICINE
Payer: COMMERCIAL

## 2023-03-24 DIAGNOSIS — E89.0 POSTABLATIVE HYPOTHYROIDISM: ICD-10-CM

## 2023-03-24 DIAGNOSIS — Z00.00 ROUTINE GENERAL MEDICAL EXAMINATION AT A HEALTH CARE FACILITY: ICD-10-CM

## 2023-03-24 DIAGNOSIS — N91.2 AMENORRHEA: ICD-10-CM

## 2023-03-24 DIAGNOSIS — D05.11 DUCTAL CARCINOMA IN SITU (DCIS) OF RIGHT BREAST: ICD-10-CM

## 2023-03-24 DIAGNOSIS — R60.9 PERIPHERAL EDEMA: ICD-10-CM

## 2023-03-24 LAB
ALBUMIN SERPL-MCNC: 3.4 G/DL (ref 3.4–5)
ALBUMIN/GLOB SERPL: 0.9 {RATIO} (ref 1–2)
ALP LIVER SERPL-CCNC: 66 U/L
ALT SERPL-CCNC: 22 U/L
ANION GAP SERPL CALC-SCNC: 6 MMOL/L (ref 0–18)
AST SERPL-CCNC: 22 U/L (ref 15–37)
BASOPHILS # BLD AUTO: 0.07 X10(3) UL (ref 0–0.2)
BASOPHILS NFR BLD AUTO: 1.3 %
BILIRUB SERPL-MCNC: 0.3 MG/DL (ref 0.1–2)
BUN BLD-MCNC: 20 MG/DL (ref 7–18)
CALCIUM BLD-MCNC: 8.9 MG/DL (ref 8.5–10.1)
CHLORIDE SERPL-SCNC: 109 MMOL/L (ref 98–112)
CHOLEST SERPL-MCNC: 248 MG/DL (ref ?–200)
CO2 SERPL-SCNC: 26 MMOL/L (ref 21–32)
CREAT BLD-MCNC: 0.77 MG/DL
EOSINOPHIL # BLD AUTO: 0.18 X10(3) UL (ref 0–0.7)
EOSINOPHIL NFR BLD AUTO: 3.4 %
ERYTHROCYTE [DISTWIDTH] IN BLOOD BY AUTOMATED COUNT: 12 %
ESTRADIOL SERPL-MCNC: 40.4 PG/ML
FASTING PATIENT LIPID ANSWER: YES
FASTING STATUS PATIENT QL REPORTED: YES
FSH SERPL-ACNC: 32.6 MIU/ML
GFR SERPLBLD BASED ON 1.73 SQ M-ARVRAT: 91 ML/MIN/1.73M2 (ref 60–?)
GLOBULIN PLAS-MCNC: 3.7 G/DL (ref 2.8–4.4)
GLUCOSE BLD-MCNC: 96 MG/DL (ref 70–99)
HCT VFR BLD AUTO: 40.5 %
HDLC SERPL-MCNC: 88 MG/DL (ref 40–59)
HGB BLD-MCNC: 12.9 G/DL
IMM GRANULOCYTES # BLD AUTO: 0.01 X10(3) UL (ref 0–1)
IMM GRANULOCYTES NFR BLD: 0.2 %
LDLC SERPL CALC-MCNC: 138 MG/DL (ref ?–100)
LH SERPL-ACNC: 17.8 MIU/ML
LYMPHOCYTES # BLD AUTO: 1.56 X10(3) UL (ref 1–4)
LYMPHOCYTES NFR BLD AUTO: 29.6 %
MCH RBC QN AUTO: 29.3 PG (ref 26–34)
MCHC RBC AUTO-ENTMCNC: 31.9 G/DL (ref 31–37)
MCV RBC AUTO: 92 FL
MONOCYTES # BLD AUTO: 0.3 X10(3) UL (ref 0.1–1)
MONOCYTES NFR BLD AUTO: 5.7 %
NEUTROPHILS # BLD AUTO: 3.15 X10 (3) UL (ref 1.5–7.7)
NEUTROPHILS # BLD AUTO: 3.15 X10(3) UL (ref 1.5–7.7)
NEUTROPHILS NFR BLD AUTO: 59.8 %
NONHDLC SERPL-MCNC: 160 MG/DL (ref ?–130)
OSMOLALITY SERPL CALC.SUM OF ELEC: 294 MOSM/KG (ref 275–295)
PLATELET # BLD AUTO: 195 10(3)UL (ref 150–450)
POTASSIUM SERPL-SCNC: 4.3 MMOL/L (ref 3.5–5.1)
PROT SERPL-MCNC: 7.1 G/DL (ref 6.4–8.2)
RBC # BLD AUTO: 4.4 X10(6)UL
SODIUM SERPL-SCNC: 141 MMOL/L (ref 136–145)
T4 FREE SERPL-MCNC: 0.9 NG/DL (ref 0.8–1.7)
TRIGL SERPL-MCNC: 131 MG/DL (ref 30–149)
TSI SER-ACNC: 2.54 MIU/ML (ref 0.36–3.74)
VLDLC SERPL CALC-MCNC: 24 MG/DL (ref 0–30)
WBC # BLD AUTO: 5.3 X10(3) UL (ref 4–11)

## 2023-03-24 PROCEDURE — 83001 ASSAY OF GONADOTROPIN (FSH): CPT

## 2023-03-24 PROCEDURE — 80053 COMPREHEN METABOLIC PANEL: CPT

## 2023-03-24 PROCEDURE — 36415 COLL VENOUS BLD VENIPUNCTURE: CPT

## 2023-03-24 PROCEDURE — 83002 ASSAY OF GONADOTROPIN (LH): CPT

## 2023-03-24 PROCEDURE — 84439 ASSAY OF FREE THYROXINE: CPT

## 2023-03-24 PROCEDURE — 80061 LIPID PANEL: CPT

## 2023-03-24 PROCEDURE — 85025 COMPLETE CBC W/AUTO DIFF WBC: CPT

## 2023-03-24 PROCEDURE — 82670 ASSAY OF TOTAL ESTRADIOL: CPT

## 2023-03-24 PROCEDURE — 84443 ASSAY THYROID STIM HORMONE: CPT

## 2023-03-27 ENCOUNTER — OFFICE VISIT (OUTPATIENT)
Dept: FAMILY MEDICINE CLINIC | Facility: CLINIC | Age: 58
End: 2023-03-27
Payer: COMMERCIAL

## 2023-03-27 VITALS
DIASTOLIC BLOOD PRESSURE: 80 MMHG | HEART RATE: 79 BPM | BODY MASS INDEX: 49.79 KG/M2 | OXYGEN SATURATION: 97 % | SYSTOLIC BLOOD PRESSURE: 120 MMHG | RESPIRATION RATE: 16 BRPM | HEIGHT: 63 IN | WEIGHT: 281 LBS

## 2023-03-27 DIAGNOSIS — Z23 NEED FOR TDAP VACCINATION: ICD-10-CM

## 2023-03-27 DIAGNOSIS — Z00.00 ANNUAL PHYSICAL EXAM: Primary | ICD-10-CM

## 2023-03-27 DIAGNOSIS — Z12.4 SCREENING FOR CERVICAL CANCER: ICD-10-CM

## 2023-03-27 DIAGNOSIS — E89.0 POSTABLATIVE HYPOTHYROIDISM: ICD-10-CM

## 2023-03-27 DIAGNOSIS — E78.2 MIXED HYPERLIPIDEMIA: ICD-10-CM

## 2023-03-27 DIAGNOSIS — E66.01 MORBID OBESITY (HCC): ICD-10-CM

## 2023-03-27 PROCEDURE — 90715 TDAP VACCINE 7 YRS/> IM: CPT | Performed by: FAMILY MEDICINE

## 2023-03-27 PROCEDURE — 3008F BODY MASS INDEX DOCD: CPT | Performed by: FAMILY MEDICINE

## 2023-03-27 PROCEDURE — 3079F DIAST BP 80-89 MM HG: CPT | Performed by: FAMILY MEDICINE

## 2023-03-27 PROCEDURE — 99396 PREV VISIT EST AGE 40-64: CPT | Performed by: FAMILY MEDICINE

## 2023-03-27 PROCEDURE — 3074F SYST BP LT 130 MM HG: CPT | Performed by: FAMILY MEDICINE

## 2023-03-27 PROCEDURE — 90471 IMMUNIZATION ADMIN: CPT | Performed by: FAMILY MEDICINE

## 2023-03-27 PROCEDURE — 99214 OFFICE O/P EST MOD 30 MIN: CPT | Performed by: FAMILY MEDICINE

## 2023-03-28 ENCOUNTER — TELEPHONE (OUTPATIENT)
Dept: FAMILY MEDICINE CLINIC | Facility: CLINIC | Age: 58
End: 2023-03-28

## 2023-03-28 DIAGNOSIS — E66.01 MORBID OBESITY (HCC): Primary | ICD-10-CM

## 2023-03-28 NOTE — TELEPHONE ENCOUNTER
Received fax Ozempic 0.25 pen unable to be approved due to coverage is provided for the diagnosis of DM type 2 only. Please advise.

## 2023-03-29 NOTE — TELEPHONE ENCOUNTER
Ok to refer to our weight loss clinic and also see Bariatric surgery, she had her previous bariatric surgery through [facility], (I believe his name was dr. Piotr Brandt), ok for external referral to see he previous Bariatric. Breath sounds clear and equal bilaterally.

## 2023-04-03 ENCOUNTER — PATIENT MESSAGE (OUTPATIENT)
Dept: FAMILY MEDICINE CLINIC | Facility: CLINIC | Age: 58
End: 2023-04-03

## 2023-04-04 ENCOUNTER — TELEPHONE (OUTPATIENT)
Dept: FAMILY MEDICINE CLINIC | Facility: CLINIC | Age: 58
End: 2023-04-04

## 2023-04-04 DIAGNOSIS — E78.2 MIXED HYPERLIPIDEMIA: Primary | ICD-10-CM

## 2023-04-04 NOTE — TELEPHONE ENCOUNTER
From: Vivi Cutler  To: Deandre Dacosta MD  Sent: 4/3/2023 8:42 PM CDT  Subject: Pap smear    Results should have been back by now for my test done on 3/24. When can I expect to see them?

## 2023-04-13 ENCOUNTER — HOSPITAL ENCOUNTER (OUTPATIENT)
Dept: MAMMOGRAPHY | Facility: HOSPITAL | Age: 58
Discharge: HOME | End: 2023-04-13
Attending: INTERNAL MEDICINE
Payer: COMMERCIAL

## 2023-04-13 DIAGNOSIS — D05.11 DUCTAL CARCINOMA IN SITU (DCIS) OF RIGHT BREAST: ICD-10-CM

## 2023-04-13 LAB — HPV I/H RISK 1 DNA SPEC QL NAA+PROBE: NEGATIVE

## 2023-04-13 PROCEDURE — 77066 DX MAMMO INCL CAD BI: CPT | Performed by: INTERNAL MEDICINE

## 2023-04-13 PROCEDURE — 77062 BREAST TOMOSYNTHESIS BI: CPT | Performed by: INTERNAL MEDICINE

## 2023-04-18 ENCOUNTER — TELEPHONE (OUTPATIENT)
Dept: FAMILY MEDICINE CLINIC | Facility: CLINIC | Age: 58
End: 2023-04-18

## 2023-04-18 NOTE — TELEPHONE ENCOUNTER
----- Message from Phillip Ohara MD sent at 4/17/2023  9:02 AM CDT -----  Negative PAP, ok to recheck in 3 years, check if she needs diflucan, yeast infection noted on PAP.

## 2023-04-19 ENCOUNTER — OFFICE VISIT (OUTPATIENT)
Dept: HEMATOLOGY/ONCOLOGY | Facility: HOSPITAL | Age: 58
End: 2023-04-19
Attending: INTERNAL MEDICINE
Payer: COMMERCIAL

## 2023-04-19 VITALS
SYSTOLIC BLOOD PRESSURE: 138 MMHG | OXYGEN SATURATION: 97 % | BODY MASS INDEX: 50 KG/M2 | WEIGHT: 284.19 LBS | HEART RATE: 88 BPM | DIASTOLIC BLOOD PRESSURE: 76 MMHG | TEMPERATURE: 98 F | RESPIRATION RATE: 20 BRPM

## 2023-04-19 DIAGNOSIS — T50.905D ADVERSE EFFECT OF DRUG, SUBSEQUENT ENCOUNTER: ICD-10-CM

## 2023-04-19 DIAGNOSIS — D05.11 DUCTAL CARCINOMA IN SITU (DCIS) OF RIGHT BREAST: Primary | ICD-10-CM

## 2023-04-19 PROCEDURE — 99214 OFFICE O/P EST MOD 30 MIN: CPT | Performed by: INTERNAL MEDICINE

## 2023-04-19 RX ORDER — TAMOXIFEN CITRATE 20 MG/1
20 TABLET ORAL DAILY
Qty: 90 TABLET | Refills: 3 | Status: SHIPPED | OUTPATIENT
Start: 2023-04-19

## 2023-04-19 NOTE — PROGRESS NOTES
Education Record    Learner:  Patient    Disease / Diagnosis:right DCIS      Barriers / Limitations:  None   Comments:    Method:  Discussion   Comments:    General Topics:  Plan of care reviewed   Comments:    Outcome:  Shows understanding   Comments:  Taking tamoxifen. No issues. No period since March 2020. Had labs drawn.

## 2023-06-13 DIAGNOSIS — G89.29 CHRONIC PAIN OF RIGHT KNEE: ICD-10-CM

## 2023-06-13 DIAGNOSIS — M22.2X2 PATELLOFEMORAL ARTHRALGIA OF BOTH KNEES: ICD-10-CM

## 2023-06-13 DIAGNOSIS — M25.561 CHRONIC PAIN OF RIGHT KNEE: ICD-10-CM

## 2023-06-13 DIAGNOSIS — M17.11 OSTEOARTHRITIS OF RIGHT KNEE, UNSPECIFIED OSTEOARTHRITIS TYPE: ICD-10-CM

## 2023-06-13 DIAGNOSIS — M22.2X1 PATELLOFEMORAL ARTHRALGIA OF BOTH KNEES: ICD-10-CM

## 2023-06-13 RX ORDER — MELOXICAM 15 MG/1
TABLET ORAL
Qty: 90 TABLET | Refills: 3 | Status: SHIPPED | OUTPATIENT
Start: 2023-06-13

## 2023-06-21 RX ORDER — MONTELUKAST SODIUM 10 MG/1
TABLET ORAL
Qty: 90 TABLET | Refills: 3 | Status: SHIPPED | OUTPATIENT
Start: 2023-06-21

## 2023-08-28 DIAGNOSIS — E89.0 POSTABLATIVE HYPOTHYROIDISM: ICD-10-CM

## 2023-08-28 RX ORDER — LEVOTHYROXINE SODIUM 112 UG/1
112 TABLET ORAL EVERY MORNING
Qty: 90 TABLET | Refills: 3 | Status: SHIPPED | OUTPATIENT
Start: 2023-08-28

## 2023-09-28 RX ORDER — BUMETANIDE 0.5 MG/1
0.5 TABLET ORAL DAILY
Qty: 90 TABLET | Refills: 3 | Status: SHIPPED | OUTPATIENT
Start: 2023-09-28

## 2023-09-28 NOTE — TELEPHONE ENCOUNTER
Medication(s) to Refill:   Requested Prescriptions     Pending Prescriptions Disp Refills    BUMETANIDE 0.5 MG Oral Tab [Pharmacy Med Name: BUMETANIDE TABS 0.5MG] 90 tablet 3     Sig: TAKE 1 TABLET DAILY         Reason for Medication Refill being sent to Provider / Reason Protocol Failed:  [] 90 day refill has already been granted  [] Blood Pressure out of range  [] Labs Abnormal/over due  [] Medication not previously prescribed by Provider  [] Non-Protocol Medication  [] Controlled Substance   [] Due for appointment- no future appointment scheduled  [] No Follow up specified      Last Time Medication was Filled:  11/9/22      Last Office Visit with PCP: 3/27/23    When Patient was Due Back to the Office:  3-6 months  (from when PCP last addressed condition)    Future Appointments:  No future appointments.       Last Blood Pressures:  BP Readings from Last 2 Encounters:  04/19/23 : 138/76  03/27/23 : 120/80          Action taken:  [] Refill approved per protocol  [] Routing to provider for approval

## 2023-10-25 DIAGNOSIS — D05.11 DUCTAL CARCINOMA IN SITU (DCIS) OF RIGHT BREAST: ICD-10-CM

## 2023-10-25 DIAGNOSIS — T50.905D ADVERSE EFFECT OF DRUG, SUBSEQUENT ENCOUNTER: ICD-10-CM

## 2023-10-25 DIAGNOSIS — Z78.0 POSTMENOPAUSAL STATUS: Primary | ICD-10-CM

## 2023-11-14 DIAGNOSIS — D05.11 DUCTAL CARCINOMA IN SITU (DCIS) OF RIGHT BREAST: ICD-10-CM

## 2023-11-14 RX ORDER — TAMOXIFEN CITRATE 20 MG/1
20 TABLET ORAL DAILY
Qty: 90 TABLET | Refills: 1 | Status: SHIPPED | OUTPATIENT
Start: 2023-11-14

## 2024-01-08 ENCOUNTER — PATIENT MESSAGE (OUTPATIENT)
Dept: FAMILY MEDICINE CLINIC | Facility: CLINIC | Age: 59
End: 2024-01-08

## 2024-01-08 NOTE — TELEPHONE ENCOUNTER
From: Tona Stephenson  To: Lionel Mares  Sent: 1/8/2024 3:31 PM CST  Subject: Wegovy    Dr. Mares,  You had ordered Ozempic in the past but the insurance did not approve. We have a new plan & hoping Ozempic or Wegovy can be ordered. Your assistance in ordering this would be greatly appreciated.  Tona

## 2024-02-06 DIAGNOSIS — E89.0 POSTABLATIVE HYPOTHYROIDISM: ICD-10-CM

## 2024-02-06 RX ORDER — LEVOTHYROXINE SODIUM 112 UG/1
112 TABLET ORAL EVERY MORNING
Qty: 90 TABLET | Refills: 3 | Status: SHIPPED | OUTPATIENT
Start: 2024-02-06 | End: 2024-02-07

## 2024-02-07 DIAGNOSIS — E89.0 POSTABLATIVE HYPOTHYROIDISM: ICD-10-CM

## 2024-02-07 RX ORDER — LEVOTHYROXINE SODIUM 112 UG/1
112 TABLET ORAL EVERY MORNING
Qty: 102 TABLET | Refills: 1 | Status: SHIPPED | OUTPATIENT
Start: 2024-02-07

## 2024-02-07 NOTE — TELEPHONE ENCOUNTER
Patient comment: Sorry for the confusion...There has been a change in mail order provider @ the beginning of the new year. I failed to change the place my prescription needs to go. Also, I need 102 day supply please.

## 2024-03-01 DIAGNOSIS — F41.1 GAD (GENERALIZED ANXIETY DISORDER): ICD-10-CM

## 2024-03-01 RX ORDER — ESCITALOPRAM OXALATE 10 MG/1
10 TABLET ORAL DAILY
Qty: 90 TABLET | Refills: 4 | OUTPATIENT
Start: 2024-03-01

## 2024-03-05 ENCOUNTER — HOSPITAL ENCOUNTER (OUTPATIENT)
Dept: BONE DENSITY | Age: 59
Discharge: HOME | End: 2024-03-05
Attending: INTERNAL MEDICINE
Payer: COMMERCIAL

## 2024-03-05 DIAGNOSIS — D05.11 DUCTAL CARCINOMA IN SITU (DCIS) OF RIGHT BREAST: ICD-10-CM

## 2024-03-05 DIAGNOSIS — Z78.0 POSTMENOPAUSAL STATUS: ICD-10-CM

## 2024-03-05 DIAGNOSIS — T50.905D ADVERSE EFFECT OF DRUG, SUBSEQUENT ENCOUNTER: ICD-10-CM

## 2024-03-05 PROCEDURE — 77080 DXA BONE DENSITY AXIAL: CPT | Performed by: INTERNAL MEDICINE

## 2024-04-17 ENCOUNTER — HOSPITAL ENCOUNTER (OUTPATIENT)
Dept: MAMMOGRAPHY | Facility: HOSPITAL | Age: 59
Discharge: HOME | End: 2024-04-17
Attending: INTERNAL MEDICINE
Payer: COMMERCIAL

## 2024-04-17 DIAGNOSIS — D05.11 DUCTAL CARCINOMA IN SITU (DCIS) OF RIGHT BREAST: ICD-10-CM

## 2024-04-17 PROCEDURE — 77062 BREAST TOMOSYNTHESIS BI: CPT | Performed by: INTERNAL MEDICINE

## 2024-04-17 PROCEDURE — 77066 DX MAMMO INCL CAD BI: CPT | Performed by: INTERNAL MEDICINE

## 2024-04-28 DIAGNOSIS — F41.1 GAD (GENERALIZED ANXIETY DISORDER): ICD-10-CM

## 2024-04-28 DIAGNOSIS — E89.0 POSTABLATIVE HYPOTHYROIDISM: ICD-10-CM

## 2024-04-29 RX ORDER — ESCITALOPRAM OXALATE 10 MG/1
10 TABLET ORAL DAILY
Qty: 90 TABLET | Refills: 4 | Status: SHIPPED | OUTPATIENT
Start: 2024-04-29

## 2024-04-29 RX ORDER — LEVOTHYROXINE SODIUM 112 UG/1
112 TABLET ORAL EVERY MORNING
Qty: 102 TABLET | Refills: 1 | Status: SHIPPED | OUTPATIENT
Start: 2024-04-29

## 2024-05-02 ENCOUNTER — PATIENT MESSAGE (OUTPATIENT)
Dept: FAMILY MEDICINE CLINIC | Facility: CLINIC | Age: 59
End: 2024-05-02

## 2024-05-02 DIAGNOSIS — G89.29 CHRONIC PAIN OF RIGHT KNEE: ICD-10-CM

## 2024-05-02 DIAGNOSIS — M25.561 CHRONIC PAIN OF RIGHT KNEE: ICD-10-CM

## 2024-05-02 DIAGNOSIS — M22.2X2 PATELLOFEMORAL ARTHRALGIA OF BOTH KNEES: ICD-10-CM

## 2024-05-02 DIAGNOSIS — M17.11 OSTEOARTHRITIS OF RIGHT KNEE, UNSPECIFIED OSTEOARTHRITIS TYPE: ICD-10-CM

## 2024-05-02 DIAGNOSIS — M22.2X1 PATELLOFEMORAL ARTHRALGIA OF BOTH KNEES: ICD-10-CM

## 2024-05-02 RX ORDER — MELOXICAM 15 MG/1
15 TABLET ORAL DAILY PRN
Qty: 30 TABLET | Refills: 0 | OUTPATIENT
Start: 2024-05-02

## 2024-05-02 RX ORDER — MONTELUKAST SODIUM 10 MG/1
10 TABLET ORAL DAILY
Qty: 90 TABLET | Refills: 3 | OUTPATIENT
Start: 2024-05-02

## 2024-05-03 RX ORDER — MELOXICAM 15 MG/1
15 TABLET ORAL DAILY PRN
Qty: 11 TABLET | Refills: 0 | Status: SHIPPED | OUTPATIENT
Start: 2024-05-03

## 2024-05-03 RX ORDER — MONTELUKAST SODIUM 10 MG/1
10 TABLET ORAL DAILY
Qty: 11 TABLET | Refills: 0 | Status: SHIPPED | OUTPATIENT
Start: 2024-05-03

## 2024-05-23 DIAGNOSIS — M25.561 CHRONIC PAIN OF RIGHT KNEE: ICD-10-CM

## 2024-05-23 DIAGNOSIS — M22.2X2 PATELLOFEMORAL ARTHRALGIA OF BOTH KNEES: ICD-10-CM

## 2024-05-23 DIAGNOSIS — E89.0 POSTABLATIVE HYPOTHYROIDISM: ICD-10-CM

## 2024-05-23 DIAGNOSIS — G89.29 CHRONIC PAIN OF RIGHT KNEE: ICD-10-CM

## 2024-05-23 DIAGNOSIS — M17.11 OSTEOARTHRITIS OF RIGHT KNEE, UNSPECIFIED OSTEOARTHRITIS TYPE: ICD-10-CM

## 2024-05-23 DIAGNOSIS — M22.2X1 PATELLOFEMORAL ARTHRALGIA OF BOTH KNEES: ICD-10-CM

## 2024-05-23 RX ORDER — LEVOTHYROXINE SODIUM 112 UG/1
112 TABLET ORAL EVERY MORNING
Qty: 102 TABLET | Refills: 1 | Status: SHIPPED | OUTPATIENT
Start: 2024-05-23

## 2024-05-23 RX ORDER — MELOXICAM 15 MG/1
15 TABLET ORAL DAILY PRN
Qty: 11 TABLET | Refills: 0 | Status: SHIPPED | OUTPATIENT
Start: 2024-05-23

## 2024-05-30 ENCOUNTER — TELEPHONE (OUTPATIENT)
Dept: FAMILY MEDICINE CLINIC | Facility: CLINIC | Age: 59
End: 2024-05-30

## 2024-05-30 NOTE — TELEPHONE ENCOUNTER
Rcvd fax from [facility] req a 90 day quantity change for pt insurance to cover Meloxicam 15 mg. Dr Mares signed fax and I faxed back to [phone] confirmed.

## 2024-06-27 ENCOUNTER — OFFICE VISIT (OUTPATIENT)
Dept: FAMILY MEDICINE CLINIC | Facility: CLINIC | Age: 59
End: 2024-06-27

## 2024-06-27 VITALS
RESPIRATION RATE: 16 BRPM | HEART RATE: 92 BPM | HEIGHT: 63 IN | DIASTOLIC BLOOD PRESSURE: 78 MMHG | SYSTOLIC BLOOD PRESSURE: 118 MMHG | WEIGHT: 266 LBS | TEMPERATURE: 98 F | OXYGEN SATURATION: 98 % | BODY MASS INDEX: 47.13 KG/M2

## 2024-06-27 DIAGNOSIS — Z12.4 SCREENING FOR CERVICAL CANCER: ICD-10-CM

## 2024-06-27 DIAGNOSIS — Z00.00 ANNUAL PHYSICAL EXAM: Primary | ICD-10-CM

## 2024-06-27 PROBLEM — E66.813 OBESITY, CLASS III, BMI 40-49.9 (MORBID OBESITY): Status: ACTIVE | Noted: 2022-08-17

## 2024-06-27 PROBLEM — C50.919 MALIGNANT NEOPLASM OF BREAST (HCC): Status: ACTIVE | Noted: 2024-06-27

## 2024-06-27 PROBLEM — R06.09 CHRONIC DYSPNEA: Status: ACTIVE | Noted: 2022-08-17

## 2024-06-27 PROBLEM — K21.00 GASTRO-ESOPHAGEAL REFLUX DISEASE WITH ESOPHAGITIS: Status: ACTIVE | Noted: 2020-11-05

## 2024-06-27 PROBLEM — R60.0 EDEMA OF RIGHT LOWER EXTREMITY: Status: ACTIVE | Noted: 2022-08-17

## 2024-06-27 PROBLEM — E03.9 HYPOTHYROIDISM: Status: ACTIVE | Noted: 2024-06-27

## 2024-06-27 PROBLEM — E66.01 OBESITY, CLASS III, BMI 40-49.9 (MORBID OBESITY) (HCC): Status: ACTIVE | Noted: 2022-08-17

## 2024-06-27 PROBLEM — R13.10 DYSPHAGIA: Status: ACTIVE | Noted: 2020-10-16

## 2024-06-27 PROCEDURE — 88175 CYTOPATH C/V AUTO FLUID REDO: CPT | Performed by: FAMILY MEDICINE

## 2024-06-27 PROCEDURE — 99396 PREV VISIT EST AGE 40-64: CPT | Performed by: FAMILY MEDICINE

## 2024-06-27 RX ORDER — TOPIRAMATE 25 MG/1
TABLET ORAL
COMMUNITY
Start: 2024-06-24

## 2024-06-27 RX ORDER — PHENTERMINE HYDROCHLORIDE 37.5 MG/1
37.5 TABLET ORAL DAILY
COMMUNITY
Start: 2024-05-21

## 2024-06-29 NOTE — PROGRESS NOTES
HPI:    Tona Stephenson is a 58 year old female who presents for Physical (Pt states need pap since she is still on Tomoxifen. /)         Past History:   She  has a past medical history of Allergic rhinitis, Arthritis, Back problem, Breast CA (HCC) (2019), Cancer (HCC) (2019), Depression, Ductal carcinoma in situ of breast (), Exposure to medical diagnostic radiation (), Graves disease, Hyperthyroidism, Obesity, Osteoarthritis (3/15/21), and Visual impairment.   She  has a past surgical history that includes cdh lapband program; spine surgery procedure unlisted (,); other surgical history (2019); other surgical history (2019); radiation right (2019); lumpectomy right (2019); needle biopsy right (2019); other accessory (Left, 2022); colonoscopy; d & c; and  ().   Her family history includes Anemia in her father; Breast Cancer (age of onset: 51) in her self; Cancer in her father; DCIS in her self; Depression in her son; Diabetes in her father; Hypertension in her father; Lipids in her father; Obesity in her father; Stroke in her father, mother, and another family member; cad in her father; copd in her mother.   She  reports that she has never smoked. She has never used smokeless tobacco. She reports current alcohol use of about 2.0 standard drinks of alcohol per week. She reports that she does not use drugs.     She is not on any long-term medications.   She is allergic to bermuda grass and sulfites.     Current Outpatient Medications on File Prior to Visit   Medication Sig    Phentermine HCl 37.5 MG Oral Tab Take 1 tablet (37.5 mg total) by mouth daily.    topiramate 25 MG Oral Tab TAKE 1 TABLET EVERY DAY BY ORAL ROUTE IN THE MORNING.    levothyroxine 112 MCG Oral Tab Take 1 tablet (112 mcg total) by mouth every morning.    Meloxicam 15 MG Oral Tab Take 1 tablet (15 mg total) by mouth daily as needed for Pain.    montelukast 10 MG Oral Tab Take 1 tablet (10 mg  total) by mouth daily.    escitalopram 10 MG Oral Tab Take 1 tablet (10 mg total) by mouth daily.    tamoxifen 20 MG Oral Tab Take 1 tablet (20 mg total) by mouth daily.    bumetanide 0.5 MG Oral Tab Take 1 tablet (0.5 mg total) by mouth daily.    TRAMADOL 50 MG Oral Tab TAKE 1 TABLET BY MOUTH AT  NIGHT AS NEEDED FOR PAIN    Coenzyme Q10 (COQ-10) 200 MG Oral Cap     Multiple Vitamins-Minerals (MULTIVITAL) Oral Tab Take 1 tablet by mouth daily.    Cholecalciferol (VITAMIN D) 2000 UNITS Oral Tab Take 2,000 mg by mouth daily.       No current facility-administered medications on file prior to visit.         REVIEW OF SYSTEMS:   Patient denies shortness of breath, denies chest pain and denies any recent fevers or chills.    Patient reports no urinary complaints and denies headaches or visual disturbances.   Patient denies any abdominal pain at this time. Patient has no new skin lesions.  Patient reports no acute back pain and reports no dizziness or headaches.   Patient reports no visual disturbances and reports hearing has been about the same.   Patient reports no recent injury or trauma.               EXAM:    /78   Pulse 92   Temp 97.6 °F (36.4 °C)   Resp 16   Ht 5' 3\" (1.6 m)   Wt 266 lb (120.7 kg)   SpO2 98%   BMI 47.12 kg/m²  Estimated body mass index is 47.12 kg/m² as calculated from the following:    Height as of this encounter: 5' 3\" (1.6 m).    Weight as of this encounter: 266 lb (120.7 kg).    General Appearance:  Alert, cooperative, no distress, appears stated age   Head:  Normocephalic, without obvious abnormality, atraumatic   Eyes:  conjunctiva/cornea is not erythematous.        Nose: No nasal drainage.    Throat: No erythema    Neck: Supple, symmetrical, trachea midline, and normal ROM  thyroid: no obvious nodules   Back:   Symmetric, no curvature, ROM normal, no CVA tenderness   Lungs:   Clear to auscultation bilaterally, respirations unlabored   Chest Wall:  No tenderness or deformity    Heart:  Regular rate and rhythm, S1, S2 normal, no murmur,   Abdomen:   Soft, non-tender, bowel sounds active. No hernia.    Genitalia:     Rectal:     Extremities: Extremities normal, atraumatic, no cyanosis or edema   Pulses: 2+ and symmetric   Skin: Skin color, texture, turgor normal, no new rashes    Lymph nodes: No obvious cervical adenopathy.    Neurologic and psych: Normal speech, Alert and oriented x 3.   Normal mood, normal insight and judgment.    PAP done with MA in room.                 ASSESSMENT AND PLAN:   1. Annual physical exam  -wellness visit and due for labs, weight loss and preventive labs, imaging, c-scope  - Comp Metabolic Panel (14); Future  - CBC With Differential With Platelet; Future  - Lipid Panel; Future  - TSH W Reflex To Free T4; Future    2. Screening for cervical cancer  -will keep once a year until further notice.   - ThinPrep PAP with HPV Reflex Request B; Future  - ThinPrep PAP with HPV Reflex Request B  - Image-Guided Pap Smear ([facility])       Lionel Mares MD, 6/29/2024, 3:16 PM     Note to patient: The 21st Century Cures Act makes medical notes like these available to patients in the interest of transparency. However, this is a medical document intended as peer to peer communication. It is written in medical language and may contain abbreviations or verbiage that are unfamiliar. It may appear blunt or direct. Medical documents are intended to carry relevant information, facts as evident, and the clinical opinion of the practitioner who signs the document.

## 2024-07-02 LAB
.: NORMAL
.: NORMAL

## 2024-07-10 DIAGNOSIS — D05.11 DUCTAL CARCINOMA IN SITU (DCIS) OF RIGHT BREAST: ICD-10-CM

## 2024-07-10 RX ORDER — TAMOXIFEN CITRATE 20 MG/1
20 TABLET ORAL DAILY
Qty: 90 TABLET | Refills: 0 | Status: SHIPPED | OUTPATIENT
Start: 2024-07-10

## 2024-07-12 NOTE — PROGRESS NOTES
[facility] Progress Note      Patient Name:  Tona Stephenson  YOB: 1965  Medical Record Number:  [id number]    Date of visit:  7/15/2024    CHIEF COMPLAINT: R breast DCIS s/p surgery and RT.    HPI:     58 year old that I have followed since 7/19 for abnormal mammogram.  S/p R lumpectomy 6/19-3.6 cm grade 2 DCIS with close margin, underwent repeat excision 7/19.  Tumor 100% ER, 100% CO.  Started tamoxifen 8/19.  On Tamoxifen. Presents for evaluation.    No new complaints.  LMP 3/20.    SOCIAL HISTORY:    .19 yr old son.    ROS:     Constitutional: mild wt gain recently due to problems with lap band malfunction  Neurologic: no headache, seizures, diplopia or weakness  Respiratory: no cough, SOB or hemoptysis  Cardiovascular: no chest pain, ankle swelling, MCK[place]  GI: no nausea, vomiting, diarrhea or BRBPR  Musculoskeletal: chr joint pain  Dermatologic: no alopecia, rash, pruritis  : no hematuria, dysuria or frequency  Psych: no confusion or depression   Heme: no easy bruising or bleeding     ALLERGIES:    Allergies   Allergen Reactions    Bermuda Grass OTHER (SEE COMMENTS)     Rhinitis    Sulfites OTHER (SEE COMMENTS)     Swollen lips and uvula, sulfa drugs ok.       MEDICATIONS:    Current Outpatient Medications   Medication Sig Dispense Refill    tamoxifen 20 MG Oral Tab Take 1 tablet (20 mg total) by mouth daily. 90 tablet 0    Phentermine HCl 37.5 MG Oral Tab Take 1 tablet (37.5 mg total) by mouth daily.      topiramate 25 MG Oral Tab TAKE 1 TABLET EVERY DAY BY ORAL ROUTE IN THE MORNING.      levothyroxine 112 MCG Oral Tab Take 1 tablet (112 mcg total) by mouth every morning. 102 tablet 1    Meloxicam 15 MG Oral Tab Take 1 tablet (15 mg total) by mouth daily as needed for Pain. 11 tablet 0    montelukast 10 MG Oral Tab Take 1 tablet (10 mg total) by mouth daily. 11 tablet 0    escitalopram 10 MG Oral Tab Take 1 tablet (10 mg total) by mouth daily. 90 tablet 4    bumetanide 0.5 MG  Oral Tab Take 1 tablet (0.5 mg total) by mouth daily. 90 tablet 3    Coenzyme Q10 (COQ-10) 200 MG Oral Cap       Multiple Vitamins-Minerals (MULTIVITAL) Oral Tab Take 1 tablet by mouth daily.      Cholecalciferol (VITAMIN D) 2000 UNITS Oral Tab Take 2,000 mg by mouth daily.           VITALS:  Height: --  Weight: 118.8 kg (262 lb) (07/15 1313)  BSA (Calculated - sq m): --  Pulse: 87 (07/15 1313)  BP: 129/87 (07/15 1313)  Temp: 98.1 °F (36.7 °C) (07/15 1313)  Do Not Use - Resp Rate: --  SpO2: 98 % (07/15 1313)    PS:  ECO    PHYSICAL EXAM:    General: alert and oriented x 3, not in acute distress.  HEENT: CHANI, oropharynx  clear.  Neck: supple.  No JVD /adenopathy.  CVS: S1S2, regular  Rhythm, no murmurs.   Lungs: Clear to auscultation and percussion.  Abdomen: Soft, non tender, no hepato-splenomegaly.  Extremities:  No edema.  CNS: no focal deficit  Breasts: Both breasts are soft.  Right breast shows well-healed lumpectomy scar.  No axillary adenopathy or masses.    Emotional well being: Patient's emotional well being was assessed.  No issues requiring acute psychosocial intervention were identified.     LABS:     No results found for this or any previous visit (from the past 24 hour(s)).    ASSESSMENT AND PLAN:     # R breast DCIS (Tis Nx Mx): R breast DCIS in , repeat margin excised .  Completed RT .  Started tamoxifen , may stop in .  Labs last year showed postmenopausal state but we kept her on tamoxifen due to tolerability. Up-to-date on Pap/pelvic-.     Mammogram  okay, next due .     At her request, diagnostic mammogram order placed.  If not covered, will switch to screening.  Also asked her to talk to her insurance about additional imaging at the 6-month waqas-MRI or whole breast ultrasound given increased density.    ORDERS PLACED:        Procedures    [facility] MINA 2D+3D DIAGNOSTIC [facility]  BILAT (CPT=77066/82164)       Return in about 1 year (around 7/15/2025) for MD  visit.    Lindsay Butler M.D.    [facility]    [address] [place], [address]    7/15/2024

## 2024-07-15 ENCOUNTER — OFFICE VISIT (OUTPATIENT)
Dept: HEMATOLOGY/ONCOLOGY | Age: 59
End: 2024-07-15
Attending: INTERNAL MEDICINE
Payer: COMMERCIAL

## 2024-07-15 VITALS
OXYGEN SATURATION: 98 % | HEART RATE: 87 BPM | SYSTOLIC BLOOD PRESSURE: 129 MMHG | RESPIRATION RATE: 18 BRPM | DIASTOLIC BLOOD PRESSURE: 87 MMHG | BODY MASS INDEX: 46 KG/M2 | WEIGHT: 262 LBS | TEMPERATURE: 98 F

## 2024-07-15 DIAGNOSIS — D05.11 DUCTAL CARCINOMA IN SITU (DCIS) OF RIGHT BREAST: Primary | ICD-10-CM

## 2024-07-15 PROCEDURE — 99214 OFFICE O/P EST MOD 30 MIN: CPT | Performed by: INTERNAL MEDICINE

## 2024-07-15 NOTE — PROGRESS NOTES
Education Record    Learner:  Patient    Disease / Diagnosis: right DCIS       Barriers / Limitations:  None   Comments:    Method:  Discussion   Comments:    General Topics:  Plan of care reviewed   Comments:    Outcome:  Shows understanding   Comments:   Breast imaging up to date.   Taking tamoxifen, denies side effects.   No new medical problems.

## 2024-07-19 ENCOUNTER — LAB ENCOUNTER (OUTPATIENT)
Dept: LAB | Age: 59
End: 2024-07-19
Attending: FAMILY MEDICINE
Payer: COMMERCIAL

## 2024-07-19 DIAGNOSIS — Z00.00 ANNUAL PHYSICAL EXAM: ICD-10-CM

## 2024-07-19 LAB
ALBUMIN SERPL-MCNC: 4.4 G/DL (ref 3.2–4.8)
ALBUMIN/GLOB SERPL: 1.6 {RATIO} (ref 1–2)
ALP LIVER SERPL-CCNC: 73 U/L
ALT SERPL-CCNC: 29 U/L
ANION GAP SERPL CALC-SCNC: 6 MMOL/L (ref 0–18)
AST SERPL-CCNC: 25 U/L (ref ?–34)
BASOPHILS # BLD AUTO: 0.07 X10(3) UL (ref 0–0.2)
BASOPHILS NFR BLD AUTO: 1.3 %
BILIRUB SERPL-MCNC: 0.4 MG/DL (ref 0.3–1.2)
BUN BLD-MCNC: 15 MG/DL (ref 9–23)
CALCIUM BLD-MCNC: 9.4 MG/DL (ref 8.7–10.4)
CHLORIDE SERPL-SCNC: 108 MMOL/L (ref 98–112)
CHOLEST SERPL-MCNC: 223 MG/DL (ref ?–200)
CO2 SERPL-SCNC: 26 MMOL/L (ref 21–32)
CREAT BLD-MCNC: 0.75 MG/DL
EGFRCR SERPLBLD CKD-EPI 2021: 93 ML/MIN/1.73M2 (ref 60–?)
EOSINOPHIL # BLD AUTO: 0.12 X10(3) UL (ref 0–0.7)
EOSINOPHIL NFR BLD AUTO: 2.3 %
ERYTHROCYTE [DISTWIDTH] IN BLOOD BY AUTOMATED COUNT: 12.2 %
FASTING PATIENT LIPID ANSWER: YES
FASTING STATUS PATIENT QL REPORTED: YES
GLOBULIN PLAS-MCNC: 2.8 G/DL (ref 2.8–4.4)
GLUCOSE BLD-MCNC: 84 MG/DL (ref 70–99)
HCT VFR BLD AUTO: 40.2 %
HDLC SERPL-MCNC: 75 MG/DL (ref 40–59)
HGB BLD-MCNC: 12.9 G/DL
IMM GRANULOCYTES # BLD AUTO: 0.01 X10(3) UL (ref 0–1)
IMM GRANULOCYTES NFR BLD: 0.2 %
LDLC SERPL CALC-MCNC: 129 MG/DL (ref ?–100)
LYMPHOCYTES # BLD AUTO: 1.49 X10(3) UL (ref 1–4)
LYMPHOCYTES NFR BLD AUTO: 28.5 %
MCH RBC QN AUTO: 30.1 PG (ref 26–34)
MCHC RBC AUTO-ENTMCNC: 32.1 G/DL (ref 31–37)
MCV RBC AUTO: 93.9 FL
MONOCYTES # BLD AUTO: 0.28 X10(3) UL (ref 0.1–1)
MONOCYTES NFR BLD AUTO: 5.4 %
NEUTROPHILS # BLD AUTO: 3.25 X10 (3) UL (ref 1.5–7.7)
NEUTROPHILS # BLD AUTO: 3.25 X10(3) UL (ref 1.5–7.7)
NEUTROPHILS NFR BLD AUTO: 62.3 %
NONHDLC SERPL-MCNC: 148 MG/DL (ref ?–130)
OSMOLALITY SERPL CALC.SUM OF ELEC: 290 MOSM/KG (ref 275–295)
PLATELET # BLD AUTO: 203 10(3)UL (ref 150–450)
POTASSIUM SERPL-SCNC: 4.3 MMOL/L (ref 3.5–5.1)
PROT SERPL-MCNC: 7.2 G/DL (ref 5.7–8.2)
RBC # BLD AUTO: 4.28 X10(6)UL
SODIUM SERPL-SCNC: 140 MMOL/L (ref 136–145)
TRIGL SERPL-MCNC: 110 MG/DL (ref 30–149)
TSI SER-ACNC: 2.24 MIU/ML (ref 0.55–4.78)
VLDLC SERPL CALC-MCNC: 20 MG/DL (ref 0–30)
WBC # BLD AUTO: 5.2 X10(3) UL (ref 4–11)

## 2024-07-19 PROCEDURE — 36415 COLL VENOUS BLD VENIPUNCTURE: CPT

## 2024-07-19 PROCEDURE — 80053 COMPREHEN METABOLIC PANEL: CPT

## 2024-07-19 PROCEDURE — 85025 COMPLETE CBC W/AUTO DIFF WBC: CPT

## 2024-07-19 PROCEDURE — 84443 ASSAY THYROID STIM HORMONE: CPT

## 2024-07-19 PROCEDURE — 80061 LIPID PANEL: CPT

## 2024-07-26 DIAGNOSIS — E78.2 MIXED HYPERLIPIDEMIA: Primary | ICD-10-CM

## 2024-07-31 DIAGNOSIS — G89.29 CHRONIC PAIN OF RIGHT KNEE: ICD-10-CM

## 2024-07-31 DIAGNOSIS — M22.2X1 PATELLOFEMORAL ARTHRALGIA OF BOTH KNEES: ICD-10-CM

## 2024-07-31 DIAGNOSIS — M17.11 OSTEOARTHRITIS OF RIGHT KNEE, UNSPECIFIED OSTEOARTHRITIS TYPE: ICD-10-CM

## 2024-07-31 DIAGNOSIS — M22.2X2 PATELLOFEMORAL ARTHRALGIA OF BOTH KNEES: ICD-10-CM

## 2024-07-31 DIAGNOSIS — M25.561 CHRONIC PAIN OF RIGHT KNEE: ICD-10-CM

## 2024-07-31 RX ORDER — MELOXICAM 15 MG/1
15 TABLET ORAL DAILY PRN
Qty: 90 TABLET | Refills: 0 | Status: SHIPPED | OUTPATIENT
Start: 2024-07-31

## 2024-08-29 NOTE — TELEPHONE ENCOUNTER
Tona Stephenson requesting Medication Refill for:    Medication name and dose (copy and paste from medication list): montelukast 10 MG Oral Tab     Preferred Pharmacy:   [facility]/pharmacy #[id number] - [place], [place]        LOV: 6/27/2024   Last Refill date: unk  Next Scheduled appointment: NA      Patient is switching from [facility] to [facility] because of insurance change and there are only 11 pills left on the RX for Montelukast.  They are asking for a 90 day RX

## 2024-08-29 NOTE — TELEPHONE ENCOUNTER
Patient requesting 90 refill of monteslukast sent to [facility]  Last RX sent 5/3/24 for 11 tablets   LOV 6/27/24    Please approve or deny pended RX

## 2024-08-30 RX ORDER — MONTELUKAST SODIUM 10 MG/1
10 TABLET ORAL DAILY
Qty: 90 TABLET | Refills: 0 | Status: SHIPPED | OUTPATIENT
Start: 2024-08-30

## 2024-09-10 DIAGNOSIS — D05.11 DUCTAL CARCINOMA IN SITU (DCIS) OF RIGHT BREAST: ICD-10-CM

## 2024-09-10 RX ORDER — TAMOXIFEN CITRATE 20 MG/1
20 TABLET ORAL DAILY
Qty: 90 TABLET | Refills: 3 | OUTPATIENT
Start: 2024-09-10

## 2024-10-10 DIAGNOSIS — E89.0 POSTABLATIVE HYPOTHYROIDISM: ICD-10-CM

## 2024-10-11 RX ORDER — LEVOTHYROXINE SODIUM 112 UG/1
112 TABLET ORAL EVERY MORNING
Qty: 90 TABLET | Refills: 3 | Status: SHIPPED | OUTPATIENT
Start: 2024-10-11

## 2024-10-21 DIAGNOSIS — M22.2X1 PATELLOFEMORAL ARTHRALGIA OF BOTH KNEES: ICD-10-CM

## 2024-10-21 DIAGNOSIS — F41.1 GAD (GENERALIZED ANXIETY DISORDER): ICD-10-CM

## 2024-10-21 DIAGNOSIS — M22.2X2 PATELLOFEMORAL ARTHRALGIA OF BOTH KNEES: ICD-10-CM

## 2024-10-21 DIAGNOSIS — G89.29 CHRONIC PAIN OF RIGHT KNEE: ICD-10-CM

## 2024-10-21 DIAGNOSIS — M25.561 CHRONIC PAIN OF RIGHT KNEE: ICD-10-CM

## 2024-10-21 DIAGNOSIS — M17.11 OSTEOARTHRITIS OF RIGHT KNEE, UNSPECIFIED OSTEOARTHRITIS TYPE: ICD-10-CM

## 2024-10-21 RX ORDER — BUMETANIDE 0.5 MG/1
0.5 TABLET ORAL DAILY
Qty: 90 TABLET | Refills: 3 | Status: SHIPPED | OUTPATIENT
Start: 2024-10-21

## 2024-10-21 RX ORDER — MONTELUKAST SODIUM 10 MG/1
10 TABLET ORAL DAILY
Qty: 90 TABLET | Refills: 0 | Status: SHIPPED | OUTPATIENT
Start: 2024-10-21

## 2024-10-21 RX ORDER — MELOXICAM 15 MG/1
15 TABLET ORAL DAILY PRN
Qty: 90 TABLET | Refills: 0 | Status: SHIPPED | OUTPATIENT
Start: 2024-10-21

## 2024-10-21 RX ORDER — ESCITALOPRAM OXALATE 10 MG/1
10 TABLET ORAL DAILY
Qty: 90 TABLET | Refills: 0 | Status: SHIPPED | OUTPATIENT
Start: 2024-10-21

## 2024-10-21 NOTE — TELEPHONE ENCOUNTER
Medication(s) to Refill:   Requested Prescriptions     Pending Prescriptions Disp Refills    bumetanide 0.5 MG Oral Tab 90 tablet 3     Sig: Take 1 tablet (0.5 mg total) by mouth daily.    escitalopram 10 MG Oral Tab 90 tablet 0     Sig: Take 1 tablet (10 mg total) by mouth daily.    Meloxicam 15 MG Oral Tab 90 tablet 0     Sig: Take 1 tablet (15 mg total) by mouth daily as needed for Pain.    montelukast 10 MG Oral Tab 90 tablet 0     Sig: Take 1 tablet (10 mg total) by mouth daily.         Reason for Medication Refill being sent to Provider / Reason Protocol Failed:  [] 90 day refill has already been granted  [] Blood Pressure out of range  [] Labs Abnormal/over due  [] Medication not previously prescribed by Provider  [] Non-Protocol Medication  [] Controlled Substance   [] Due for appointment- no future appointment scheduled  [] No Follow up specified      Last Time Medication was Filled:  9/28/23      Last Office Visit with PCP: 6/27/24    When Patient was Due Back to the Office:    (from when PCP last addressed condition)    Future Appointments:  Future Appointments   Date Time Provider Department Center   11/13/2024 12:30 PM Rosa Sheldon MD [facility] [address]         Last Blood Pressures:  BP Readings from Last 2 Encounters:   07/15/24 129/87   06/27/24 118/78       Action taken:  [] Refill approved per protocol  [] Routing to provider for approval

## 2024-11-12 NOTE — PATIENT INSTRUCTIONS
To schedule an appointment for your radiology test please call [facility] Scheduling at [phone].

## 2024-11-13 ENCOUNTER — OFFICE VISIT (OUTPATIENT)
Dept: OBGYN CLINIC | Facility: CLINIC | Age: 59
End: 2024-11-13
Payer: COMMERCIAL

## 2024-11-13 VITALS
HEART RATE: 103 BPM | DIASTOLIC BLOOD PRESSURE: 74 MMHG | BODY MASS INDEX: 44 KG/M2 | SYSTOLIC BLOOD PRESSURE: 110 MMHG | WEIGHT: 250.81 LBS

## 2024-11-13 DIAGNOSIS — Z92.29 HISTORY OF TAMOXIFEN THERAPY: ICD-10-CM

## 2024-11-13 DIAGNOSIS — N93.9 ABNORMAL UTERINE BLEEDING: Primary | ICD-10-CM

## 2024-11-13 PROCEDURE — 99203 OFFICE O/P NEW LOW 30 MIN: CPT | Performed by: OBSTETRICS & GYNECOLOGY

## 2024-11-13 NOTE — PROGRESS NOTES
[facility]  Obstetrics and Gynecology Referral  History & Physical    CC: Patient is a new patient and referred for abnormal bleeding after tamoxifen therapy    Subjective:     HPI: Tona Stephenson is a 58 year old  female referred for abnormal bleeding after tamoxifen therapy.     She has a history of Grade 2 DCIS S/p R lumpectomy 2019-3.6 cm grade 2 DCIS with close margin, underwent repeat excision 2019.  Tumor 100% ER, 100% FL.  Started tamoxifen 2019. Follows with Dr. Butler (Onc). She discontinued tamoxifen therapy in July and has noted abnormal uterine bleeding that started 2 weeks after discontinuation. She states the bleeding has varied in amount from heavier to lighter, and has experienced it every day since. She wears a liner for protection every day.      OB History:  OB History    Para Term  AB Living   1 1 1     1   SAB IAB Ectopic Multiple Live Births           1      # Outcome Date GA Lbr Mike/2nd Weight Sex Type Anes PTL Lv   1 Term 10/22/03 37w0d  5 lb 14.4 oz (2.676 kg) M NORMAL SPONT Spinal  GINO       Gyne History:  Menarche: 14  Period Cycle (Days): Menopause  Period Duration (Days): N/A  Period Flow: N/A  Use of Birth Control (if yes, specify type): Postmenopausal  Hx Prior Abnormal Pap: Yes  Pap Date: 24  Pap Result Notes: WNL  No LMP recorded. (Menstrual status: Menopause).      Denies history of STDs (non-HPV).   Sexual history: currently Active  Birth control? Postmenopausal     Pap History:   2024 - NILM (due in 3 years)   3/2023 - NILM HPV neg     Meds:  Medications Ordered Prior to Encounter[1]    All:  Allergies[2]    PMH:  Past Medical History:    Allergic rhinitis    Arthritis    Back problem    Breast CA (HCC)    DCIS    Cancer (HCC)    right breast    Depression    Ductal carcinoma in situ of breast    Exposure to medical diagnostic radiation    Graves disease    Hyperthyroidism    Obesity    Osteoarthritis    Visual impairment    glasses        PSH:  Past Surgical History:   Procedure Laterality Date    Cdh lapband program      Colonoscopy      D & c      Lumpectomy right  2019    DCIS    Needle biopsy right  2019    US guided bx -2 sites - both DCIS      2003    Other accessory Left 2022    thumb surgery, for torn ligaments    Other surgical history  2019    Right partial mastectomy with wire localization    Other surgical history  2019    Right breast re-excision of margins    Radiation right  2019    Spine surgery procedure unlisted  ,       Social History:  Social History     Socioeconomic History    Marital status:      Spouse name: Not on file    Number of children: Not on file    Years of education: Not on file    Highest education level: Not on file   Occupational History    Not on file   Tobacco Use    Smoking status: Never    Smokeless tobacco: Never   Vaping Use    Vaping status: Never Used   Substance and Sexual Activity    Alcohol use: Yes     Alcohol/week: 2.0 standard drinks of alcohol     Types: 2 Glasses of wine per week     Comment: Socially    Drug use: Yes     Types: Cannabis     Comment: occasional edible    Sexual activity: Yes     Partners: Male   Other Topics Concern    Caffeine Concern Yes    Exercise Yes    Seat Belt Yes    Special Diet Yes     Comment: Lap band 2015    Stress Concern Yes     Comment: Family issues (caring for elder father & son with hx of depr    Weight Concern Yes     Comment: Had lap band 2015     Service Not Asked    Blood Transfusions Not Asked    Occupational Exposure Not Asked    Hobby Hazards Not Asked    Sleep Concern Not Asked    Back Care Not Asked    Bike Helmet Not Asked    Self-Exams Not Asked   Social History Narrative    Not on file     Social Drivers of Health     Financial Resource Strain: Not on file   Food Insecurity: Not on file   Transportation Needs: Not on file   Physical Activity: Not on file   Stress: Not on file   Social  Connections: Not on file   Housing Stability: Not on file       Family History:  Family History   Problem Relation Age of Onset    Hypertension Father     Lipids Father     Obesity Father     Diabetes Father     Cancer Father         lung    Other (cad) Father         stents    Anemia Father     Stroke Father     Other (copd) Mother         pulmonary fibrosis    Stroke Mother     Stroke Other     Breast Cancer Self 53    DCIS Self     Depression Son        Review of Systems:  General: no complaints per category. See HPI for additional information.   Breast: no complaints per category. See HPI for additional information.   Respiratory: no complaints per category. See HPI for additional information.   Cardiovascular: no complaints per category. See HPI for additional information.   GI: no complaints per category. See HPI for additional information.   : no complaints per category. See HPI for additional information.   Heme: no complaints per category. See HPI for additional information.       Objective:     Vitals:    24 1235   BP: 110/74   Pulse: 103   Weight: 250 lb 12.8 oz (113.8 kg)         Body mass index is 44.43 kg/m².    General: AAO.NAD.   CVS exam: normal peripheral perfusion  Chest: non-labored breathing, no tachypnea   Breast: deferred   Abdominal exam: soft, nontender, nondistended  Pelvic exam: deferred   Ext: non-tender, no edema        Assessment:     Tona Stephenson is a 58 year old  female referred for abnormal bleeding after tamoxifen therapy.       Plan:     Problem List Items Addressed This Visit    None  Visit Diagnoses       Abnormal uterine bleeding    -  Primary    Relevant Orders    US PELVIS (TRANSABDOMINAL AND TRANSVAGINAL) (CPT=76856/42294)    History of tamoxifen therapy                Vaginal Bleeding after Tamoxifen usage  - Diagram drawn to review pelvic anatomy with patient, noting increased risk of endometrial hyperplasia after tamoxifen therapy but also additional  endometrial-related conditions such as an endometrial polyp  - Pelvic ultrasound ordered for evaluation of endometrial thickness and possible visualization for any polyps  - Recommend endometrial sampling for evaluation for endometrial hyperplasia or malignancy. EMB instructions reviewed. Patient voiced understanding and declined pelvic examination today.       All of the findings and plan were discussed with the patient.  She notes understanding and agrees with the plan of care.  All questions were answered to the best of my ability at this time.      Total patient time was 30 minutes in evaluation, consultation, and coordination of care.  This included face to face and non-face to face actions. The patient's questions and concerns were addressed.     RTC for EMB in 2-3 weeks     Rosa Sheldon MD   [facility] - O[facility]      Note to patient and family   The 21st Century Cures Act makes medical notes available to patients in the interest of transparency.  However, please be advised that this is a medical document.  It is intended as [id number] communication.  It is written and medical language may contain abbreviations or verbiage that are technical and unfamiliar.  It may appear blunt or direct.  Medical documents are intended to carry relevant information, facts as evident, and the clinical opinion of the practitioner.               [1]   Current Outpatient Medications on File Prior to Visit   Medication Sig Dispense Refill    bumetanide 0.5 MG Oral Tab Take 1 tablet (0.5 mg total) by mouth daily. 90 tablet 3    escitalopram 10 MG Oral Tab Take 1 tablet (10 mg total) by mouth daily. 90 tablet 0    Meloxicam 15 MG Oral Tab Take 1 tablet (15 mg total) by mouth daily as needed for Pain. 90 tablet 0    montelukast 10 MG Oral Tab Take 1 tablet (10 mg total) by mouth daily. 90 tablet 0    LEVOTHYROXINE 112 MCG Oral Tab TAKE 1 TABLET BY MOUTH EVERY  MORNING 90 tablet 3    Phentermine HCl 37.5 MG Oral Tab Take 1 tablet (37.5 mg  total) by mouth daily.      topiramate 25 MG Oral Tab TAKE 1 TABLET EVERY DAY BY ORAL ROUTE IN THE MORNING.      Coenzyme Q10 (COQ-10) 200 MG Oral Cap       Multiple Vitamins-Minerals (MULTIVITAL) Oral Tab Take 1 tablet by mouth daily.      Cholecalciferol (VITAMIN D) 2000 UNITS Oral Tab Take 2,000 mg by mouth daily.        tamoxifen 20 MG Oral Tab Take 1 tablet (20 mg total) by mouth daily. 90 tablet 0     No current facility-administered medications on file prior to visit.   [2]   Allergies  Allergen Reactions    Bermuda Grass OTHER (SEE COMMENTS)     Rhinitis    Sulfites OTHER (SEE COMMENTS)     Swollen lips and uvula, sulfa drugs ok.

## 2024-12-03 ENCOUNTER — HOSPITAL ENCOUNTER (OUTPATIENT)
Dept: ULTRASOUND IMAGING | Age: 59
Discharge: HOME | End: 2024-12-03
Attending: OBSTETRICS & GYNECOLOGY
Payer: COMMERCIAL

## 2024-12-03 ENCOUNTER — TELEMEDICINE (OUTPATIENT)
Dept: FAMILY MEDICINE CLINIC | Facility: CLINIC | Age: 59
End: 2024-12-03
Payer: COMMERCIAL

## 2024-12-03 ENCOUNTER — TELEPHONE (OUTPATIENT)
Dept: FAMILY MEDICINE CLINIC | Facility: CLINIC | Age: 59
End: 2024-12-03

## 2024-12-03 DIAGNOSIS — J40 SINOBRONCHITIS: Primary | ICD-10-CM

## 2024-12-03 DIAGNOSIS — N93.9 ABNORMAL UTERINE BLEEDING: ICD-10-CM

## 2024-12-03 DIAGNOSIS — J32.9 SINOBRONCHITIS: Primary | ICD-10-CM

## 2024-12-03 PROCEDURE — 76830 TRANSVAGINAL US NON-OB: CPT | Performed by: OBSTETRICS & GYNECOLOGY

## 2024-12-03 PROCEDURE — 99214 OFFICE O/P EST MOD 30 MIN: CPT | Performed by: NURSE PRACTITIONER

## 2024-12-03 PROCEDURE — 76856 US EXAM PELVIC COMPLETE: CPT | Performed by: OBSTETRICS & GYNECOLOGY

## 2024-12-03 RX ORDER — DEXTROMETHORPHAN HYDROBROMIDE AND PROMETHAZINE HYDROCHLORIDE 15; 6.25 MG/5ML; MG/5ML
5 SYRUP ORAL 4 TIMES DAILY PRN
Qty: 118 ML | Refills: 0 | Status: SHIPPED | OUTPATIENT
Start: 2024-12-03 | End: 2024-12-13

## 2024-12-03 RX ORDER — MONTELUKAST SODIUM 10 MG/1
10 TABLET ORAL DAILY
Qty: 90 TABLET | Refills: 0 | Status: SHIPPED | OUTPATIENT
Start: 2024-12-03

## 2024-12-03 RX ORDER — BENZONATATE 200 MG/1
200 CAPSULE ORAL 3 TIMES DAILY PRN
Qty: 60 CAPSULE | Refills: 0 | Status: SHIPPED | OUTPATIENT
Start: 2024-12-03

## 2024-12-03 NOTE — PROGRESS NOTES
Telehealth outside of [facility]  Telehealth Verbal Consent   I conducted a telehealth visit with Tona Stephenson today, 12/03/24, which was completed using two-way, real-time interactive audio and video communication. This has been done in good zonia to provide continuity of care in the best interest of the provider-patient relationship, due to the COVID -19 public health crisis/national emergency where restrictions of face-to-face office visits are ongoing. Every conscious effort was taken to allow for sufficient and adequate time to complete the visit.  The patient was made aware of the limitations of the telehealth visit, including treatment limitations as no physical exam could be performed.  The patient was advised to call 911 or to go to the ER in case there was an emergency.  The patient was also advised of the potential privacy & security concerns related to the telehealth platform.   The patient was made aware of where to find [facility]'s notice of privacy practices, telehealth consent form and other related consent forms and documents.  which are located on the [facility] website. The patient verbally agreed to telehealth consent form, related consents and the risks discussed.    Lastly, the patient confirmed that they were in [place].   Included in this visit, time may have been spent reviewing labs, medications, radiology tests and decision making. Appropriate medical decision-making and tests are ordered as detailed in the plan of care above.  Coding/billing information is submitted for this visit based on complexity of care and/or time spent for the visit.  Duration 5 minutes   No chief complaint on file.      HPI:   Tona Stephenson is a 59 year old female who presents for cough  for  5  days. Patient reports congestion, cough with yellow  colored sputum, cough is keeping pt up at night, sinus pain, wheezing, denies fever.Cough started gradually, tight, wheezy,deep, dry, worse at night, OTC cough syrups not  helpful, getting worse.  Trigger for the cough:Sinus pressure, postnasal dripping, getting worse, yellow rhinorrhea.         Current Outpatient Medications   Medication Sig Dispense Refill    bumetanide 0.5 MG Oral Tab Take 1 tablet (0.5 mg total) by mouth daily. 90 tablet 3    escitalopram 10 MG Oral Tab Take 1 tablet (10 mg total) by mouth daily. 90 tablet 0    Meloxicam 15 MG Oral Tab Take 1 tablet (15 mg total) by mouth daily as needed for Pain. 90 tablet 0    montelukast 10 MG Oral Tab Take 1 tablet (10 mg total) by mouth daily. 90 tablet 0    LEVOTHYROXINE 112 MCG Oral Tab TAKE 1 TABLET BY MOUTH EVERY  MORNING 90 tablet 3    tamoxifen 20 MG Oral Tab Take 1 tablet (20 mg total) by mouth daily. 90 tablet 0    Phentermine HCl 37.5 MG Oral Tab Take 1 tablet (37.5 mg total) by mouth daily.      topiramate 25 MG Oral Tab TAKE 1 TABLET EVERY DAY BY ORAL ROUTE IN THE MORNING.      Coenzyme Q10 (COQ-10) 200 MG Oral Cap       Multiple Vitamins-Minerals (MULTIVITAL) Oral Tab Take 1 tablet by mouth daily.      Cholecalciferol (VITAMIN D) 2000 UNITS Oral Tab Take 2,000 mg by mouth daily.          Past Medical History:    Allergic rhinitis    Arthritis    Back problem    Breast CA (HCC)    DCIS    Cancer (HCC)    right breast    Depression    Ductal carcinoma in situ of breast    Exposure to medical diagnostic radiation    Graves disease    Hyperthyroidism    Obesity    Osteoarthritis    Visual impairment    glasses      Past Surgical History:   Procedure Laterality Date    [facility] lapband program      Colonoscopy      D & c      Lumpectomy right  2019    DCIS    Needle biopsy right  2019    US guided bx -2 sites - both DCIS          Other accessory Left 2022    thumb surgery, for torn ligaments    Other surgical history  2019    Right partial mastectomy with wire localization    Other surgical history  2019    Right breast re-excision of margins    Radiation right  2019    Spine surgery  procedure unlisted  1994,1998      Family History   Problem Relation Age of Onset    Hypertension Father     Lipids Father     Obesity Father     Diabetes Father     Cancer Father         lung    Other (cad) Father         stents    Anemia Father     Stroke Father     Other (copd) Mother         pulmonary fibrosis    Stroke Mother     Stroke Other     Breast Cancer Self 53    DCIS Self     Depression Son       Social History     Socioeconomic History    Marital status:    Tobacco Use    Smoking status: Never    Smokeless tobacco: Never   Vaping Use    Vaping status: Never Used   Substance and Sexual Activity    Alcohol use: Yes     Alcohol/week: 2.0 standard drinks of alcohol     Types: 2 Glasses of wine per week     Comment: Socially    Drug use: Yes     Types: Cannabis     Comment: occasional edible    Sexual activity: Yes     Partners: Male   Other Topics Concern    Caffeine Concern Yes    Exercise Yes    Seat Belt Yes    Special Diet Yes     Comment: Lap band 2/2015    Stress Concern Yes     Comment: Family issues (caring for elder father & son with hx of depr    Weight Concern Yes     Comment: Had lap band 2/2015         REVIEW OF SYSTEMS:   GENERAL: no fever, no chills.  SKIN: no rashes, no hives.  EYES:denies blurred vision or double vision,   HEENT: no earache, sore throat, mild sinus congestion.  CHEST: no chest pains, no palpitations, no orthopnea.  LUNGS: denies shortness of breath with exertion or rest. Wheezing, cough.  CARDIOVASCULAR: denies chest pain on exertion  GI: no nausea or abdominal pain      EXAM:   There were no vitals taken for this visit.  GENERAL: well developed, congested in no apparent distress      ASSESSMENT AND PLAN:   Tona Stephenson is a 59 year old female who presents with:   Diagnoses and all orders for this visit:    Sinobronchitis  -     amoxicillin clavulanate 875-125 MG Oral Tab; Take 1 tablet by mouth 2 (two) times daily for 10 days.  -     benzonatate 200 MG Oral  Cap; Take 1 capsule (200 mg total) by mouth 3 (three) times daily as needed for cough.  -     promethazine-dextromethorphan 6.25-15 MG/5ML Oral Syrup; Take 5 mL by mouth 4 (four) times daily as needed for cough.       Increase fluids, rest.Meds as below.  The patient indicates understanding of these issues and agrees to the plan.  The patient is asked to return in one week if sx's persist or worsen.

## 2024-12-06 ENCOUNTER — OFFICE VISIT (OUTPATIENT)
Dept: OBGYN CLINIC | Facility: CLINIC | Age: 59
End: 2024-12-06
Payer: COMMERCIAL

## 2024-12-06 VITALS
HEART RATE: 96 BPM | WEIGHT: 245 LBS | SYSTOLIC BLOOD PRESSURE: 124 MMHG | DIASTOLIC BLOOD PRESSURE: 76 MMHG | BODY MASS INDEX: 43.41 KG/M2 | HEIGHT: 63 IN

## 2024-12-06 DIAGNOSIS — R93.89 THICKENED ENDOMETRIUM: ICD-10-CM

## 2024-12-06 DIAGNOSIS — N93.9 ABNORMAL UTERINE BLEEDING (AUB): ICD-10-CM

## 2024-12-06 DIAGNOSIS — N93.9 ABNORMAL UTERINE BLEEDING: Primary | ICD-10-CM

## 2024-12-06 LAB
CONTROL LINE PRESENT WITH A CLEAR BACKGROUND (YES/NO): YES YES/NO
KIT LOT #: NORMAL NUMERIC
PREGNANCY TEST, URINE: NEGATIVE

## 2024-12-06 PROCEDURE — 58100 BIOPSY OF UTERUS LINING: CPT | Performed by: OBSTETRICS & GYNECOLOGY

## 2024-12-06 PROCEDURE — 88305 TISSUE EXAM BY PATHOLOGIST: CPT | Performed by: OBSTETRICS & GYNECOLOGY

## 2024-12-06 PROCEDURE — 81025 URINE PREGNANCY TEST: CPT | Performed by: OBSTETRICS & GYNECOLOGY

## 2024-12-06 NOTE — PROCEDURES
Procedure: Endometrial biopsy     Date of Procedure: 24    Pre-procedure diagnosis:  AUB after tamoxifen therapy     Post-procedure diagnosis:   AUB after tamoxifen therapy    Indications:   59 year old female  who presents for endometrial biopsy 2/2  AUB after tamoxifen  therapy.    Procedure details:  The procedure, risks, benefits and alternatives were discussed with the patient. The patient was informed of risks including but not limited to the risk of bleeding, infection, injury and insufficient tissue collection. All questions and concerns were addressed. The patient provided verbal and written consent.     The patient was placed in a supine position with feet positioned into stirrups. A sterile speculum was placed into the vagina and the cervix was visualized with findings noted below. The cervix was cleaned and prepped with betadine.  Lidocaine gel followed by an April was placed on the anterior lip of the cervix. The endometrial Pipelle was then advanced through the cervical canal. The uterus sounded to 7 cm. The Pipelle was then engaged with suction force noted and advance in various angles along the uterine cavity. A moderate amount of endometrial tissue was noted and collected to be sent to pathology. This was repeated for 1 more pass.  All instruments were removed. Good hemostasis noted. The patient tolerated the procedure well.     Findings:   Normal cervix, no lesions   Normal uterus, sounded to 7 cm   Moderate amount of endometrial tissue   S/p EMB  Good hemostasis     Disposition: Stable    Complications: None    Follow up:  I will [facility], and an appointment can be scheduled if indicated after that time.       Rosa Sheldon MD   [facility] - OBGYN      Note to patient and family   The  [employer] Cures Act makes medical notes available to patients in the interest of transparency.  However, please be advised that this is a medical document.  It is  intended as [id number] communication.  It is written and medical language may contain abbreviations or verbiage that are technical and unfamiliar.  It may appear blunt or direct.  Medical documents are intended to carry relevant information, facts as evident, and the clinical opinion of the practitioner.

## 2024-12-12 ENCOUNTER — PATIENT MESSAGE (OUTPATIENT)
Dept: OBGYN CLINIC | Facility: CLINIC | Age: 59
End: 2024-12-12

## 2024-12-13 ENCOUNTER — TELEPHONE (OUTPATIENT)
Dept: OBGYN CLINIC | Facility: CLINIC | Age: 59
End: 2024-12-13

## 2024-12-13 DIAGNOSIS — N84.0 ENDOMETRIAL POLYP: Primary | ICD-10-CM

## 2024-12-13 NOTE — TELEPHONE ENCOUNTER
Surgery scheduled for 1/7/25 at730  Pre and post op   Future Appointments   Date Time Provider Department Center   12/31/2024 12:15 PM Rosa Sheldon MD [facility] [address]   1/21/2025 10:15 AM Rosa Sheldon MD [facility] [address]     Added to calendar  Sent letter   PA - will work on

## 2024-12-13 NOTE — TELEPHONE ENCOUNTER
See pathology results.  Provider sent communication to patient on [employer] and also contacted her by phone.

## 2024-12-13 NOTE — TELEPHONE ENCOUNTER
----- Message from Rosa Sheldon sent at 12/13/2024  8:20 AM CST -----  Regarding: Hysteroscopy, polypectomy with [facility]  Surgeon: Dr. Rosa Sheldon   Assistant: none   - first preference is with [facility] rep but would take physician partner if no [facility] rep is availbale     Type of Admit: Hospital outpatient, does not require admission following surgery   Procedure Location: Main OR    PreOp Dx: Endometrial polyp on tamoxifen (history of breast cancer)     Procedure: Dilation, hysteroscopy, hysteroscopic endometrial sampling and curettage     Anesthesia: MAC    Special Equipment/Comments: [employer] TRUCLEAR hysteroscopy with blades     Antibiotics: None  Pre Op Orders: initiate my Pre-op standing orders, if none exist please use [facility]'s Standing Order   Labs: per protocol     Medical clearance: YES     Pre-op and Post-op appointment desired       Availability:   - 12/18 AM before [facility] at 10:30  - 12/23 to follow first case before [facility] at 11 am   - Tues 1/7 AM before [facility] at 10 am    - Fri 1/24 to follow tubal

## 2024-12-16 NOTE — TELEPHONE ENCOUNTER
Procedure Code 1  93142    Quantity  1 Visits    Procedure From - To Date  2024-12-16    Status  NO AUTH REQUIRED    Message  PRECERT IS NOT REQUIRED OR ONLY REQUIRED IN UNIQUE CIRCUMSTANCES FOR MEDICAID REFER TO PRIOR AUTH TOOL ON [facility] FOR ALL OTHERS REFER TO CODE SEARCH TOOL ON [employer] COVERAGE OF SERVICES ARE SUBJECT TO BENEFITS AND ELIGIBILITY

## 2024-12-17 ENCOUNTER — TELEPHONE (OUTPATIENT)
Dept: FAMILY MEDICINE CLINIC | Facility: CLINIC | Age: 59
End: 2024-12-17

## 2024-12-17 NOTE — TELEPHONE ENCOUNTER
Patient scheduled for surgery on 1/7/2025  Surgeon Rosa Sheldon MD   [facility]   Pre-op on 12/27/24 with PCP.   Routed green sheet and orders to provider's bin.

## 2024-12-26 DIAGNOSIS — F41.1 GAD (GENERALIZED ANXIETY DISORDER): ICD-10-CM

## 2024-12-26 RX ORDER — ESCITALOPRAM OXALATE 10 MG/1
10 TABLET ORAL DAILY
Qty: 90 TABLET | Refills: 3 | Status: SHIPPED | OUTPATIENT
Start: 2024-12-26

## 2024-12-26 NOTE — TELEPHONE ENCOUNTER
Medication(s) to Refill:   Requested Prescriptions     Pending Prescriptions Disp Refills    ESCITALOPRAM 10 MG Oral Tab [Pharmacy Med Name: Escitalopram Oxalate 10 MG Oral Tablet] 90 tablet 3     Sig: TAKE 1 TABLET BY MOUTH DAILY         Reason for Medication Refill being sent to Provider / Reason Protocol Failed:  [] 90 day refill has already been granted  [] Blood Pressure out of range  [] Labs Abnormal/over due  [] Medication not previously prescribed by Provider  [] Non-Protocol Medication  [] Controlled Substance   [] Due for appointment- no future appointment scheduled  [] No Follow up specified      Last Time Medication was Filled:  10/21/24      Last Office Visit with PCP: 12/3/24    When Patient was Due Back to the Office:    (from when PCP last addressed condition)    Future Appointments:  Future Appointments   Date Time Provider Department Center   12/27/2024 11:00 AM Lionel Mares MD [facility]   12/31/2024 12:15 PM Rosa Sheldon MD [facility] [address]   1/27/2025 11:45 AM Rosa Sheldon MD [facility] [address]         Last Blood Pressures:  BP Readings from Last 2 Encounters:   12/06/24 124/76   11/13/24 110/74         Action taken:  [] Refill approved per protocol  [] Routing to provider for approval

## 2024-12-27 ENCOUNTER — LAB ENCOUNTER (OUTPATIENT)
Dept: LAB | Age: 59
End: 2024-12-27
Attending: FAMILY MEDICINE
Payer: COMMERCIAL

## 2024-12-27 ENCOUNTER — OFFICE VISIT (OUTPATIENT)
Dept: FAMILY MEDICINE CLINIC | Facility: CLINIC | Age: 59
End: 2024-12-27
Payer: COMMERCIAL

## 2024-12-27 VITALS
BODY MASS INDEX: 43.23 KG/M2 | DIASTOLIC BLOOD PRESSURE: 86 MMHG | WEIGHT: 244 LBS | OXYGEN SATURATION: 97 % | SYSTOLIC BLOOD PRESSURE: 130 MMHG | HEIGHT: 63 IN | HEART RATE: 90 BPM

## 2024-12-27 DIAGNOSIS — N84.0 ENDOMETRIAL POLYP: ICD-10-CM

## 2024-12-27 DIAGNOSIS — J30.89 NON-SEASONAL ALLERGIC RHINITIS, UNSPECIFIED TRIGGER: ICD-10-CM

## 2024-12-27 DIAGNOSIS — Z01.818 PRE-OP EVALUATION: Primary | ICD-10-CM

## 2024-12-27 DIAGNOSIS — E89.0 POSTABLATIVE HYPOTHYROIDISM: ICD-10-CM

## 2024-12-27 DIAGNOSIS — E66.01 OBESITY, CLASS III, BMI 40-49.9 (MORBID OBESITY) (HCC): ICD-10-CM

## 2024-12-27 DIAGNOSIS — Z01.818 PRE-OP EVALUATION: ICD-10-CM

## 2024-12-27 PROBLEM — R13.10 DYSPHAGIA: Status: RESOLVED | Noted: 2020-10-16 | Resolved: 2024-12-27

## 2024-12-27 PROBLEM — C50.919 MALIGNANT NEOPLASM OF BREAST (HCC): Status: RESOLVED | Noted: 2024-06-27 | Resolved: 2024-12-27

## 2024-12-27 PROBLEM — E03.9 HYPOTHYROIDISM: Status: RESOLVED | Noted: 2024-06-27 | Resolved: 2024-12-27

## 2024-12-27 PROBLEM — M54.50 LOW BACK PAIN: Status: RESOLVED | Noted: 2019-09-04 | Resolved: 2024-12-27

## 2024-12-27 PROBLEM — R06.09 CHRONIC DYSPNEA: Status: RESOLVED | Noted: 2022-08-17 | Resolved: 2024-12-27

## 2024-12-27 LAB
ALBUMIN SERPL-MCNC: 4.6 G/DL (ref 3.2–4.8)
ALBUMIN/GLOB SERPL: 1.6 {RATIO} (ref 1–2)
ALP LIVER SERPL-CCNC: 111 U/L
ALT SERPL-CCNC: 22 U/L
ANION GAP SERPL CALC-SCNC: 8 MMOL/L (ref 0–18)
AST SERPL-CCNC: 24 U/L (ref ?–34)
BASOPHILS # BLD AUTO: 0.07 X10(3) UL (ref 0–0.2)
BASOPHILS NFR BLD AUTO: 1.1 %
BILIRUB SERPL-MCNC: 0.4 MG/DL (ref 0.3–1.2)
BUN BLD-MCNC: 19 MG/DL (ref 9–23)
CALCIUM BLD-MCNC: 9.9 MG/DL (ref 8.7–10.4)
CHLORIDE SERPL-SCNC: 108 MMOL/L (ref 98–112)
CO2 SERPL-SCNC: 26 MMOL/L (ref 21–32)
CREAT BLD-MCNC: 0.88 MG/DL
EGFRCR SERPLBLD CKD-EPI 2021: 77 ML/MIN/1.73M2 (ref 60–?)
EOSINOPHIL # BLD AUTO: 0.14 X10(3) UL (ref 0–0.7)
EOSINOPHIL NFR BLD AUTO: 2.2 %
ERYTHROCYTE [DISTWIDTH] IN BLOOD BY AUTOMATED COUNT: 11.9 %
FASTING STATUS PATIENT QL REPORTED: YES
GLOBULIN PLAS-MCNC: 2.9 G/DL (ref 2–3.5)
GLUCOSE BLD-MCNC: 94 MG/DL (ref 70–99)
HCT VFR BLD AUTO: 41.6 %
HGB BLD-MCNC: 13.3 G/DL
IMM GRANULOCYTES # BLD AUTO: 0.01 X10(3) UL (ref 0–1)
IMM GRANULOCYTES NFR BLD: 0.2 %
LYMPHOCYTES # BLD AUTO: 1.76 X10(3) UL (ref 1–4)
LYMPHOCYTES NFR BLD AUTO: 28.2 %
MCH RBC QN AUTO: 29.5 PG (ref 26–34)
MCHC RBC AUTO-ENTMCNC: 32 G/DL (ref 31–37)
MCV RBC AUTO: 92.2 FL
MONOCYTES # BLD AUTO: 0.43 X10(3) UL (ref 0.1–1)
MONOCYTES NFR BLD AUTO: 6.9 %
NEUTROPHILS # BLD AUTO: 3.83 X10 (3) UL (ref 1.5–7.7)
NEUTROPHILS # BLD AUTO: 3.83 X10(3) UL (ref 1.5–7.7)
NEUTROPHILS NFR BLD AUTO: 61.4 %
OSMOLALITY SERPL CALC.SUM OF ELEC: 296 MOSM/KG (ref 275–295)
PLATELET # BLD AUTO: 225 10(3)UL (ref 150–450)
POTASSIUM SERPL-SCNC: 4.2 MMOL/L (ref 3.5–5.1)
PROT SERPL-MCNC: 7.5 G/DL (ref 5.7–8.2)
RBC # BLD AUTO: 4.51 X10(6)UL
SODIUM SERPL-SCNC: 142 MMOL/L (ref 136–145)
WBC # BLD AUTO: 6.2 X10(3) UL (ref 4–11)

## 2024-12-27 PROCEDURE — 80053 COMPREHEN METABOLIC PANEL: CPT

## 2024-12-27 PROCEDURE — 99214 OFFICE O/P EST MOD 30 MIN: CPT | Performed by: FAMILY MEDICINE

## 2024-12-27 PROCEDURE — 85025 COMPLETE CBC W/AUTO DIFF WBC: CPT

## 2024-12-27 PROCEDURE — 36415 COLL VENOUS BLD VENIPUNCTURE: CPT

## 2024-12-27 NOTE — PROGRESS NOTES
HPI:    Tona Stephenson is a 59 year old female who presents for Pre-Op Exam (Patient is here for pre op/Surgery date:2025/Surgeon name :Dr. Sherri Sheldon/Surgery type: dilation truclear hysteroscopy, hysteroscopic endometrial sampling and curettage //Needs CBC, platelet/Electrolytes )     Patient will be having Dilation, TRUCLEAR HYSTEROSCOPY, hysteroscopic endometrial sampling and curettage on 25 by Rosa Collins.     Patient reports no problems with anesthesia in the past.   Patient has had no neck or intubation problems in the past.         Past History:   She  has a past medical history of Allergic rhinitis, Arthritis, Back problem, Breast CA (HCC) (2019), Cancer (HCC) (2019), Depression, Disorder of thyroid, Ductal carcinoma in situ of breast (), Exposure to medical diagnostic radiation (), Graves disease, Hyperthyroidism, Migraines, Obesity, Osteoarthritis (3/15/21), and Visual impairment.   She  has a past surgical history that includes [facility] lapband program; spine surgery procedure unlisted (,); other surgical history (2019); other surgical history (2019); radiation right (2019); lumpectomy right (2019); needle biopsy right (2019); other accessory (Left, 2022); colonoscopy; d & c;  (); endometrial biopsy - jar(s): 2 (2024); and other surgical history (Left, ).   Her family history includes Anemia in her father; Breast Cancer (age of onset: 51) in her self; Cancer in her father; DCIS in her self; Depression in her son; Diabetes in her father; Hypertension in her father; Lipids in her father; Obesity in her father; Stroke in her father, mother, and another family member; cad in her father; copd in her mother.   She  reports that she has never smoked. She has never used smokeless tobacco. She reports current alcohol use of about 2.0 standard drinks of alcohol per week. She reports current drug use. Drug: Cannabis.     She is not on  any long-term medications.   She is allergic to bermuda grass and sulfites.     Current Outpatient Medications on File Prior to Visit   Medication Sig    ESCITALOPRAM 10 MG Oral Tab TAKE 1 TABLET BY MOUTH DAILY    montelukast 10 MG Oral Tab Take 1 tablet (10 mg total) by mouth daily.    bumetanide 0.5 MG Oral Tab Take 1 tablet (0.5 mg total) by mouth daily.    Meloxicam 15 MG Oral Tab Take 1 tablet (15 mg total) by mouth daily as needed for Pain.    LEVOTHYROXINE 112 MCG Oral Tab TAKE 1 TABLET BY MOUTH EVERY  MORNING    Phentermine HCl 37.5 MG Oral Tab Take 1 tablet (37.5 mg total) by mouth daily.    topiramate 25 MG Oral Tab TAKE 1 TABLET EVERY DAY BY ORAL ROUTE IN THE MORNING.    Coenzyme Q10 (COQ-10) 200 MG Oral Cap     Multiple Vitamins-Minerals (MULTIVITAL) Oral Tab Take 1 tablet by mouth daily.    Cholecalciferol (VITAMIN D) 2000 UNITS Oral Tab Take 2,000 mg by mouth daily.       No current facility-administered medications on file prior to visit.         REVIEW OF SYSTEMS:   Patient denies shortness of breath, denies chest pain and denies any recent fevers or chills.    Patient reports no urinary complaints and denies headaches or visual disturbances.   Patient denies any abdominal pain at this time. Patient has no new skin lesions.  Patient reports no acute back pain and reports no dizziness or headaches.   Patient reports no visual disturbances and reports hearing has been about the same.   Patient reports no recent injury or trauma.               EXAM:    /86   Pulse 90   Ht 5' 3\" (1.6 m)   Wt 244 lb (110.7 kg)   LMP  (LMP Unknown)   SpO2 97%   BMI 43.22 kg/m²  Estimated body mass index is 43.22 kg/m² as calculated from the following:    Height as of this encounter: 5' 3\" (1.6 m).    Weight as of this encounter: 244 lb (110.7 kg).    General Appearance:  Alert, cooperative, no distress, appears stated age   Head:  Normocephalic, without obvious abnormality, atraumatic   Eyes:  PERRL,  conjunctiva/corneas clear, EOM's intact, fundi benign, both eyes   Ears:  Normal TM's and external ear canals, both ears   Nose: Nares normal, septum midline, mucosa normal, no drainage or sinus tenderness   Throat: Lips, mucosa, and tongue normal; teeth and gums normal   Neck: Supple, symmetrical, trachea midline, no adenopathy, thyroid: not enlarged, symmetric, no tenderness/mass/nodules, no carotid bruit or JVD   Back:   Symmetric, no curvature, ROM normal, no CVA tenderness   Lungs:   Clear to auscultation bilaterally, respirations unlabored   Chest Wall:  No tenderness or deformity   Heart:  Regular rate and rhythm, S1, S2 normal, no murmur, rub or gallop   Abdomen:   Soft, non-tender, bowel sounds active all four quadrants,  no masses, no organomegaly   Genitalia:     Rectal:     Extremities: Extremities normal, atraumatic, no cyanosis or edema   Pulses: 2+ and symmetric   Skin: Skin color, texture, turgor normal, no rashes or lesions   Lymph nodes: Cervical, supraclavicular, and axillary nodes normal   Neurologic: Normal                   ASSESSMENT AND PLAN:   1. Pre-op evaluation    - CBC W Differential W Platelet [E]; Future  - Comp Metabolic Panel (14) [E]; Future    2. Obesity, Class III, BMI 40-49.9 (morbid obesity) (HCC)      3. Postablative hypothyroidism      4. Endometrial polyp      5. Non-seasonal allergic rhinitis, unspecified trigger       Patient is low risk for surgery.   Patient's risk stratification was sent back to surgeon.     Lionel Mares MD, 12/27/2024, 11:21 AM

## 2024-12-31 ENCOUNTER — VIRTUAL PHONE E/M (OUTPATIENT)
Dept: OBGYN CLINIC | Facility: CLINIC | Age: 59
End: 2024-12-31
Payer: COMMERCIAL

## 2024-12-31 DIAGNOSIS — M25.561 CHRONIC PAIN OF RIGHT KNEE: ICD-10-CM

## 2024-12-31 DIAGNOSIS — M22.2X2 PATELLOFEMORAL ARTHRALGIA OF BOTH KNEES: ICD-10-CM

## 2024-12-31 DIAGNOSIS — M17.11 OSTEOARTHRITIS OF RIGHT KNEE, UNSPECIFIED OSTEOARTHRITIS TYPE: ICD-10-CM

## 2024-12-31 DIAGNOSIS — G89.29 CHRONIC PAIN OF RIGHT KNEE: ICD-10-CM

## 2024-12-31 DIAGNOSIS — M22.2X1 PATELLOFEMORAL ARTHRALGIA OF BOTH KNEES: ICD-10-CM

## 2024-12-31 DIAGNOSIS — N93.9 ABNORMAL UTERINE BLEEDING: Primary | ICD-10-CM

## 2024-12-31 PROCEDURE — 99214 OFFICE O/P EST MOD 30 MIN: CPT | Performed by: OBSTETRICS & GYNECOLOGY

## 2024-12-31 RX ORDER — MELOXICAM 15 MG/1
15 TABLET ORAL DAILY PRN
Qty: 90 TABLET | Refills: 1 | Status: SHIPPED | OUTPATIENT
Start: 2024-12-31 | End: 2025-01-14

## 2024-12-31 NOTE — PROGRESS NOTES
Virtual Telephone Check-In    Tona Stephenson verbally consents to a Virtual/Telephone Check-In visit on 24.  Patient has been referred to the [facility] website at [email or web address] to review the yearly Consent to Treat document.    Patient understands and accepts financial responsibility for any deductible, co-insurance and/or co-pays associated with this service.    Duration of the service: 20 minutes      Summary of topics discussed:       [facility]  Obstetrics and Gynecology  Preoperative Appointment    CC: Preoperative appointment    Subjective:     HPI: Tona Stephenson is a 59 year old  female presenting for a pre-operative exam.     She is scheduled to undergo Hysteroscopy, D&C with trueclear for removal of endometrial polyp at [facility] on 2025 at 09:45 am.     Patient reports receiving a phone call from [facility] regarding preoperative instructions and receiving preoperative clearance from her PCP.     OB History:  OB History    Para Term  AB Living   1 1 1 0 0 1   SAB IAB Ectopic Multiple Live Births   0 0 0 0 1       Gyne History:     No LMP recorded (lmp unknown). (Menstrual status: Menopause).    Pap smear history:  2024 - NILM (due in 3 years)   3/2023 - NILM HPV neg     Denies history of STDs (non-HPV).   Currently  sexually active   Birth control: postmenopausal      Meds:  Medications Ordered Prior to Encounter[1]    All:  Allergies[2]    PMH:  Past Medical History:    Allergic rhinitis    Arthritis    Back problem    Breast CA (HCC)    DCIS    Cancer (HCC)    right breast    Depression    Disorder of thyroid    Ductal carcinoma in situ of breast    Exposure to medical diagnostic radiation    last dose     Graves disease    Hyperthyroidism    Migraines    Obesity    Osteoarthritis    Visual impairment    glasses       Immunization History:   Immunization History   Administered Date(s) Administered    Covid-19 Vaccine Moderna 100  mcg/0.5 ml 2021, 2021    Influenza 2012, 10/19/2012, 10/09/2013, 2014, 09/15/2015, 10/11/2016, 10/29/2017, 10/28/2018, 10/29/2019, 10/05/2020, 10/25/2020, 2024    Influenza Virus Vaccine - Whole 2015, 10/30/2018    TDAP 2011, 2023   Deferred Date(s) Deferred    Afluria, 9 Years & >, IM 2015       PSH:  Past Surgical History:   Procedure Laterality Date    [facility] lapband program      Colonoscopy      D & c      Endometrial biopsy - jar(s): 2  2024    Lumpectomy right  2019    DCIS    Needle biopsy right  2019    US guided bx -2 sites - both DCIS          Other accessory Left 2022    thumb surgery, for torn ligaments    Other surgical history  2019    Right partial mastectomy with wire localization    Other surgical history  2019    Right breast re-excision of margins    Other surgical history Left     thumb tendon needed to be reattached    Radiation right  2019    Spine surgery procedure unlisted  ,       Social History:  Social History     Socioeconomic History    Marital status:      Spouse name: Not on file    Number of children: Not on file    Years of education: Not on file    Highest education level: Not on file   Occupational History    Not on file   Tobacco Use    Smoking status: Never    Smokeless tobacco: Never   Vaping Use    Vaping status: Never Used   Substance and Sexual Activity    Alcohol use: Yes     Alcohol/week: 2.0 standard drinks of alcohol     Types: 2 Glasses of wine per week     Comment: Socially    Drug use: Yes     Types: Cannabis     Comment: occasional edible    Sexual activity: Yes     Partners: Male   Other Topics Concern    Caffeine Concern No    Exercise No    Seat Belt Yes    Special Diet Yes     Comment: Lap band 2015    Stress Concern Yes     Comment: Family issues (caring for elder father & son with hx of depr    Weight Concern Yes     Comment: Had lap band 2015      Service Not Asked    Blood Transfusions Not Asked    Occupational Exposure Not Asked    Hobby Hazards Not Asked    Sleep Concern Not Asked    Back Care Not Asked    Bike Helmet Not Asked    Self-Exams Not Asked   Social History Narrative    Not on file     Social Drivers of Health     Financial Resource Strain: Not on file   Food Insecurity: Not on file   Transportation Needs: Not on file   Physical Activity: Not on file   Stress: Not on file   Social Connections: Not on file   Housing Stability: Not on file         Patient feels unsafe or threatened?: NO    Abuse: None (physical, sexual or mental)    Family History:  Family History   Problem Relation Age of Onset    Hypertension Father     Lipids Father     Obesity Father     Diabetes Father     Cancer Father         lung    Other (cad) Father         stents    Anemia Father     Stroke Father     Other (copd) Mother         pulmonary fibrosis    Stroke Mother     Stroke Other     Breast Cancer Self 53    DCIS Self     Depression Son        Review of Systems:  General: no complaints per category. See HPI for additional information.   Breast: no complaints per category. See HPI for additional information.   Respiratory: no complaints per category. See HPI for additional information.   Cardiovascular: no complaints per category. See HPI for additional information.   GI: no complaints per category. See HPI for additional information.   : no complaints per category. See HPI for additional information.   Heme: no complaints per category. See HPI for additional information.       Objective:   There were no vitals filed for this visit.      There is no height or weight on file to calculate BMI.    General: AAO.NAD.   CV: regular rate and rhythm, +S1, +S2, no murmurs/rubs/gallops  Pulm: clear to auscultation bilaterally   Chest: non-labored breathing, no tachypnea   Breast: deferred  Abdominal exam: soft, nontender, nondistended  Pelvic exam: deferred   Ext:  non-tender, no edema, normal peripheral perfusion    Assessment:     Tona Stephenson is a 59 year old  female presenting for a pre-operative exam.    Plan:     Problem List Items Addressed This Visit    None      Preoperative Appointment  - Scheduled to undergo Hysteroscopy, D&C with trueclear for removal of endometrial polyp at [facility] on 2025 at 09:45 am.   - Discussed recommendation for nothing to eat or drink for 8 hours prior to the procedure.   - The patient's medication list was reviewed with instructions provided.   Meloxicam - advised patient to hold for 10 days prior to surgery.   - We reviewed pain control and medications for the post-operative period. The patient will tylenol in addition to her meloxicam  - Incisional recommendations - advised pelvic rest, avoiding intercourse, baths, swimming, and standing water for 2 weeks until postoperative appointment in the office.    - We discussed the risks and benefits of the procedure. Risks included but not limited to bleeding, infection, and injury to the surrounding organs including bowel, bladder, uterus, fallopian tubes, and ovaries. Reviewed the risk of hemorrhage and patient consented to IV blood transfusion if indicated. We discussed the risk of hysterectomy if there was bleeding present unable to be controlled with medications or stitches, and that would render the patient unable to have children without Ass[employer]ductive Technology and a surrogate carrier in the future. Reviewed the risk of anesthetic complications including stroke, MI, and death. We discussed that the patient would meet the anesthesiology team on the day of the surgery in the preoperative area. Patient expressed understanding of all the risks involved. All questions were answered, and patient consented to the procedure.     Future Appointments   Date Time Provider Department Center   2025 11:45 AM Rosa Sheldon MD [facility] [address]     All of  the findings and plan were discussed with the patient.  She notes understanding and agrees with the plan of care.  All questions were answered to the best of my ability at this time.      RTC for post-operative appointment as scheduled     Rosa Sheldon MD   [facility] - OBGYN      Note to patient and family   The 21st Century Cures Act makes medical notes available to patients in the interest of transparency.  However, please be advised that this is a medical document.  It is intended as qdnq-na-hhmu communication.  It is written and medical language may contain abbreviations or verbiage that are technical and unfamiliar.  It may appear blunt or direct.  Medical documents are intended to carry relevant information, facts as evident, and the clinical opinion of the practitioner.           This note could include assistance by Dragon voice recognition. Errors in content may be related to improper recognition by the system; efforts to review and correct have been done but errors may still exist.                 [1]   Current Outpatient Medications on File Prior to Visit   Medication Sig Dispense Refill    ESCITALOPRAM 10 MG Oral Tab TAKE 1 TABLET BY MOUTH DAILY 90 tablet 3    montelukast 10 MG Oral Tab Take 1 tablet (10 mg total) by mouth daily. 90 tablet 0    bumetanide 0.5 MG Oral Tab Take 1 tablet (0.5 mg total) by mouth daily. 90 tablet 3    LEVOTHYROXINE 112 MCG Oral Tab TAKE 1 TABLET BY MOUTH EVERY  MORNING 90 tablet 3    Phentermine HCl 37.5 MG Oral Tab Take 1 tablet (37.5 mg total) by mouth daily.      topiramate 25 MG Oral Tab TAKE 1 TABLET EVERY DAY BY ORAL ROUTE IN THE MORNING.      Coenzyme Q10 (COQ-10) 200 MG Oral Cap       Multiple Vitamins-Minerals (MULTIVITAL) Oral Tab Take 1 tablet by mouth daily.      Cholecalciferol (VITAMIN D) 2000 UNITS Oral Tab Take 2,000 mg by mouth daily.         No current facility-administered medications on file prior to visit.   [2]   Allergies  Allergen Reactions    Bermuda Grass  OTHER (SEE COMMENTS)     Rhinitis    Sulfites OTHER (SEE COMMENTS)     Swollen lips and uvula, sulfa drugs ok.

## 2025-01-08 RX ORDER — MONTELUKAST SODIUM 10 MG/1
10 TABLET ORAL DAILY
Qty: 90 TABLET | Refills: 1 | Status: SHIPPED | OUTPATIENT
Start: 2025-01-08

## 2025-01-08 NOTE — TELEPHONE ENCOUNTER
LOV- 12/27/2024     Last ordered: 1 month ago (12/3/2024) by Lionel Mares MD     Last refill: 11/14/2024

## 2025-01-14 ENCOUNTER — ANESTHESIA (OUTPATIENT)
Dept: SURGERY | Facility: HOSPITAL | Age: 60
End: 2025-01-14
Payer: COMMERCIAL

## 2025-01-14 ENCOUNTER — HOSPITAL ENCOUNTER (OUTPATIENT)
Facility: HOSPITAL | Age: 60
Setting detail: HOSPITAL OUTPATIENT SURGERY
Discharge: HOME | End: 2025-01-14
Attending: OBSTETRICS & GYNECOLOGY | Admitting: OBSTETRICS & GYNECOLOGY
Payer: COMMERCIAL

## 2025-01-14 ENCOUNTER — ANESTHESIA EVENT (OUTPATIENT)
Dept: SURGERY | Facility: HOSPITAL | Age: 60
End: 2025-01-14
Payer: COMMERCIAL

## 2025-01-14 VITALS
BODY MASS INDEX: 48.49 KG/M2 | HEIGHT: 60 IN | HEART RATE: 84 BPM | RESPIRATION RATE: 16 BRPM | WEIGHT: 247 LBS | SYSTOLIC BLOOD PRESSURE: 134 MMHG | TEMPERATURE: 97 F | DIASTOLIC BLOOD PRESSURE: 73 MMHG | OXYGEN SATURATION: 99 %

## 2025-01-14 DIAGNOSIS — N84.0 ENDOMETRIAL POLYP: ICD-10-CM

## 2025-01-14 LAB — B-HCG UR QL: NEGATIVE

## 2025-01-14 PROCEDURE — 0UB98ZZ EXCISION OF UTERUS, VIA NATURAL OR ARTIFICIAL OPENING ENDOSCOPIC: ICD-10-PCS | Performed by: OBSTETRICS & GYNECOLOGY

## 2025-01-14 PROCEDURE — 0UBC8ZZ EXCISION OF CERVIX, VIA NATURAL OR ARTIFICIAL OPENING ENDOSCOPIC: ICD-10-PCS | Performed by: OBSTETRICS & GYNECOLOGY

## 2025-01-14 PROCEDURE — 58558 HYSTEROSCOPY BIOPSY: CPT | Performed by: OBSTETRICS & GYNECOLOGY

## 2025-01-14 RX ORDER — ACETAMINOPHEN 500 MG
1000 TABLET ORAL ONCE
Status: DISCONTINUED | OUTPATIENT
Start: 2025-01-14 | End: 2025-01-14 | Stop reason: HOSPADM

## 2025-01-14 RX ORDER — NALOXONE HYDROCHLORIDE 0.4 MG/ML
80 INJECTION, SOLUTION INTRAMUSCULAR; INTRAVENOUS; SUBCUTANEOUS AS NEEDED
Status: DISCONTINUED | OUTPATIENT
Start: 2025-01-14 | End: 2025-01-14

## 2025-01-14 RX ORDER — HYDROCODONE BITARTRATE AND ACETAMINOPHEN 5; 325 MG/1; MG/1
1 TABLET ORAL ONCE AS NEEDED
Status: DISCONTINUED | OUTPATIENT
Start: 2025-01-14 | End: 2025-01-14

## 2025-01-14 RX ORDER — ONDANSETRON 2 MG/ML
4 INJECTION INTRAMUSCULAR; INTRAVENOUS EVERY 6 HOURS PRN
Status: DISCONTINUED | OUTPATIENT
Start: 2025-01-14 | End: 2025-01-14

## 2025-01-14 RX ORDER — HYDROCODONE BITARTRATE AND ACETAMINOPHEN 5; 325 MG/1; MG/1
2 TABLET ORAL ONCE AS NEEDED
Status: DISCONTINUED | OUTPATIENT
Start: 2025-01-14 | End: 2025-01-14

## 2025-01-14 RX ORDER — LIDOCAINE HYDROCHLORIDE 10 MG/ML
INJECTION, SOLUTION EPIDURAL; INFILTRATION; INTRACAUDAL; PERINEURAL AS NEEDED
Status: DISCONTINUED | OUTPATIENT
Start: 2025-01-14 | End: 2025-01-14 | Stop reason: SURG

## 2025-01-14 RX ORDER — MIDAZOLAM HYDROCHLORIDE 1 MG/ML
1 INJECTION INTRAMUSCULAR; INTRAVENOUS EVERY 5 MIN PRN
Status: DISCONTINUED | OUTPATIENT
Start: 2025-01-14 | End: 2025-01-14

## 2025-01-14 RX ORDER — SODIUM CHLORIDE, SODIUM LACTATE, POTASSIUM CHLORIDE, CALCIUM CHLORIDE 600; 310; 30; 20 MG/100ML; MG/100ML; MG/100ML; MG/100ML
INJECTION, SOLUTION INTRAVENOUS CONTINUOUS
Status: DISCONTINUED | OUTPATIENT
Start: 2025-01-14 | End: 2025-01-14

## 2025-01-14 RX ORDER — ACETAMINOPHEN 500 MG
1000 TABLET ORAL ONCE AS NEEDED
Status: DISCONTINUED | OUTPATIENT
Start: 2025-01-14 | End: 2025-01-14

## 2025-01-14 RX ORDER — HYDROMORPHONE HYDROCHLORIDE 1 MG/ML
0.4 INJECTION, SOLUTION INTRAMUSCULAR; INTRAVENOUS; SUBCUTANEOUS EVERY 5 MIN PRN
Status: DISCONTINUED | OUTPATIENT
Start: 2025-01-14 | End: 2025-01-14

## 2025-01-14 RX ORDER — ONDANSETRON 2 MG/ML
INJECTION INTRAMUSCULAR; INTRAVENOUS AS NEEDED
Status: DISCONTINUED | OUTPATIENT
Start: 2025-01-14 | End: 2025-01-14 | Stop reason: SURG

## 2025-01-14 RX ORDER — DEXAMETHASONE SODIUM PHOSPHATE 4 MG/ML
VIAL (ML) INJECTION AS NEEDED
Status: DISCONTINUED | OUTPATIENT
Start: 2025-01-14 | End: 2025-01-14 | Stop reason: SURG

## 2025-01-14 RX ORDER — MIDAZOLAM HYDROCHLORIDE 1 MG/ML
INJECTION INTRAMUSCULAR; INTRAVENOUS AS NEEDED
Status: DISCONTINUED | OUTPATIENT
Start: 2025-01-14 | End: 2025-01-14 | Stop reason: SURG

## 2025-01-14 RX ORDER — KETOROLAC TROMETHAMINE 30 MG/ML
INJECTION, SOLUTION INTRAMUSCULAR; INTRAVENOUS AS NEEDED
Status: DISCONTINUED | OUTPATIENT
Start: 2025-01-14 | End: 2025-01-14 | Stop reason: SURG

## 2025-01-14 RX ORDER — HYDROMORPHONE HYDROCHLORIDE 1 MG/ML
0.6 INJECTION, SOLUTION INTRAMUSCULAR; INTRAVENOUS; SUBCUTANEOUS EVERY 5 MIN PRN
Status: DISCONTINUED | OUTPATIENT
Start: 2025-01-14 | End: 2025-01-14

## 2025-01-14 RX ORDER — PROCHLORPERAZINE EDISYLATE 5 MG/ML
5 INJECTION INTRAMUSCULAR; INTRAVENOUS EVERY 8 HOURS PRN
Status: DISCONTINUED | OUTPATIENT
Start: 2025-01-14 | End: 2025-01-14

## 2025-01-14 RX ORDER — HYDROMORPHONE HYDROCHLORIDE 1 MG/ML
0.2 INJECTION, SOLUTION INTRAMUSCULAR; INTRAVENOUS; SUBCUTANEOUS EVERY 5 MIN PRN
Status: DISCONTINUED | OUTPATIENT
Start: 2025-01-14 | End: 2025-01-14

## 2025-01-14 RX ORDER — SCOPOLAMINE 1 MG/3D
1 PATCH, EXTENDED RELEASE TRANSDERMAL ONCE
Status: DISCONTINUED | OUTPATIENT
Start: 2025-01-14 | End: 2025-01-14 | Stop reason: HOSPADM

## 2025-01-14 RX ADMIN — MIDAZOLAM HYDROCHLORIDE 2 MG: 1 INJECTION INTRAMUSCULAR; INTRAVENOUS at 09:57:00

## 2025-01-14 RX ADMIN — DEXAMETHASONE SODIUM PHOSPHATE 4 MG: 4 MG/ML VIAL (ML) INJECTION at 10:04:00

## 2025-01-14 RX ADMIN — LIDOCAINE HYDROCHLORIDE 100 MG: 10 INJECTION, SOLUTION EPIDURAL; INFILTRATION; INTRACAUDAL; PERINEURAL at 09:59:00

## 2025-01-14 RX ADMIN — ONDANSETRON 4 MG: 2 INJECTION INTRAMUSCULAR; INTRAVENOUS at 10:04:00

## 2025-01-14 RX ADMIN — KETOROLAC TROMETHAMINE 30 MG: 30 INJECTION, SOLUTION INTRAMUSCULAR; INTRAVENOUS at 10:30:00

## 2025-01-14 NOTE — OPERATIVE REPORT
Operative Note Hysteroscopy D&C    Patient Name:  Tona Stephenson  YOB: 1965  MRN:   [id number]  Admission Date: 2025    Date of Surgery: 2025    Preop Diagnosis: Endometrial polyp [N84.0]    Postop Diagnosis:Endometrial polyp    Procedure: Procedure(s) (LRB):  Dilation, TRUCLEAR HYSTEROSCOPY, hysteroscopic endometrial sampling and curettage, polypectomy (N/A)    Attending Physician: Rosa Sheldon MD    Anesthesia: MAC    EBL: 50 mL    Fluid Deficit: 300 mL     Findings: Normal appearing external genitalia, vagina, cervix. Proliferative endometrium with multiple endometrial and endocervical polyps visualized. Intact endometrial cavity at the end of the procedure.      Complications: None     Specimens: Endometrial and endocervical polyps    Disposition: Stable to post op recovery    Indications for the Procedure:   Tona Stephenson is a 59 year old  with history of abnormal uterine bleeding after tamoxifen usage. She underwent endometrial sampling in the office consistent with endometrial polyp. As such, the decision was made to proceed with hysteroscopic polypectomy. The patient was informed of risks and benefits of hysteroscopy, dilation and curettage. Risks include but not limited to bleeding, infection, injury to the vulva, vagina, or cervix, and uterine perforation. The patient understood the risks involved, all questions were answered, and the patient consented to the procedure.    Description of the Procedure:   The patient was taken to the operating room where a timeout was performed to confirm correct patient and correct procedure. Sedation was established. The patient was then positioned on the operating table in dorsal lithotomy position with legs supported using stirrups. All pressure points were padded and a shruti hugger was applied to maintain control core body temperature. Patient was then prepped and draped in usual sterile fashion.  Another timeout was  performed again to confirm correct patient and correct procedure.    Operative Technique:   A bivalve speculum was inserted into the vagina. The anterior lip of cervix visualized and grasped using a single-tooth tenaculum. The cervix was adequately dilated using the Stein cervical dilators for the introduction of the hysteroscope. After the STYLHUNT hysteroscope system was calibrated, the hysteroscope was then gently advanced into the endometrial cavity, introduced under direct visualization using normal saline as a distending media. The hysteroscope was advanced to the fundus and the entire uterine cavity was inspected with the findings as noted above. The TRUCLEAR hysteroscopic rotary blade was then advanced through the hysteroscope under direct visualization. The window lock was set to minimize fluid use and maintain uterine distention. The rotary blade was placed against the endometrial tissue tissue and the system was activated by stepping on one pedal. The TRUCLEAR hysteroscopic rotary blade simultaneously aspirated and cut the endometrial and endocervical tissue without any complications. This specimen was collected and sent as separate sample. This was repeated along the anterior, posterior and lateral surface of the endometrial cavity for a thorough sampling of the endometrium. This tissue was collected and sent to pathology as a separate sample.  The TRUCLEAR hysteroscopic rotary blade and hysteroscope were then removed from the uterus.The single-tooth tenaculum was removed from the cervix and good hemostasis was noted at the tenaculum puncture sites. The speculum was removed from the vagina.    At the end of the procedure all needle, sponge, instrument counts were noted be correct at the end of the case.  The patient tolerated procedure well and was transported to the recovery room in stable condition.      Rosa Sheldon MD  1/14/2025  10:37 AM

## 2025-01-14 NOTE — ANESTHESIA PROCEDURE NOTES
Airway  Date/Time: 1/14/2025 10:00 AM  Urgency: elective      General Information and Staff    Patient location during procedure: OR  Anesthesiologist: Myerson, David, MD  Performed: anesthesiologist   Performed by: Myerson, David, MD  Authorized by: Myerson, David, MD      Indications and Patient Condition  Indications for airway management: anesthesia  Sedation level: deep  Preoxygenated: yes  Patient position: sniffing  Mask difficulty assessment: 1 - vent by mask    Final Airway Details  Final airway type: supraglottic airway      Successful airway: classic  Size 3       Number of attempts at approach: 1

## 2025-01-14 NOTE — ANESTHESIA PREPROCEDURE EVALUATION
PRE-OP EVALUATION    Patient Name: Tona Stephenson    Admit Diagnosis: Endometrial polyp [N84.0]    Pre-op Diagnosis: Endometrial polyp [N84.0]    Dilation, TRUCLEAR HYSTEROSCOPY, hysteroscopic endometrial sampling and curettage    Anesthesia Procedure: Dilation, TRUCLEAR HYSTEROSCOPY, hysteroscopic endometrial sampling and curettage    Surgeons and Role:     * Rosa Sheldon MD - Primary    Pre-op vitals reviewed.        Body mass index is 47.85 kg/m².    Current medications reviewed.  Hospital Medications:  No current facility-administered medications on file as of 2025.       Outpatient Medications:   Prescriptions Prior to Admission[1]    Allergies: Bermuda grass and Sulfites      Anesthesia Evaluation    Patient summary reviewed.    Anesthetic Complications  (-) history of anesthetic complications         GI/Hepatic/Renal    Negative GI/hepatic/renal ROS.                             Cardiovascular      ECG reviewed.      MET: >4    (+) obesity                                       Endo/Other           (+) hypothyroidism                (+) arthritis       Pulmonary    Negative pulmonary ROS.                       Neuro/Psych      (+) depression             (+) headaches           Patient Active Problem List:     Family history of heart disease     Allergic rhinitis     Postablative hypothyroidism     History of adjustable gastric banding     Ductal carcinoma in situ (DCIS) of right breast     Osteoarthritis of knee     Family history of ischemic heart disease     Graves' disease     Edema of right lower extremity     Gastro-esophageal reflux disease with esophagitis     Obesity, Class III, BMI 40-49.9 (morbid obesity) (HCC)            Past Surgical History:   Procedure Laterality Date    [facility] lapband program      Colonoscopy      D & c      Endometrial biopsy - jar(s): 2  2024    Lumpectomy right  2019    DCIS    Needle biopsy right  2019    US guided bx -2 sites - both DCIS           Other accessory Left 11/2022    thumb surgery, for torn ligaments    Other surgical history  06/21/2019    Right partial mastectomy with wire localization    Other surgical history  07/12/2019    Right breast re-excision of margins    Other surgical history Left 2022    thumb tendon needed to be reattached    Radiation right  06/2019    Spine surgery procedure unlisted  1994,1998     Social History     Socioeconomic History    Marital status:    Tobacco Use    Smoking status: Never    Smokeless tobacco: Never   Vaping Use    Vaping status: Never Used   Substance and Sexual Activity    Alcohol use: Yes     Alcohol/week: 2.0 standard drinks of alcohol     Types: 2 Glasses of wine per week     Comment: Socially    Drug use: Yes     Types: Cannabis     Comment: occasional edible    Sexual activity: Yes     Partners: Male   Other Topics Concern    Caffeine Concern No    Exercise No    Seat Belt Yes    Special Diet Yes     Comment: Lap band 2/2015    Stress Concern Yes     Comment: Family issues (caring for elder father & son with hx of depr    Weight Concern Yes     Comment: Had lap band 2/2015     History   Drug Use    Types: Cannabis     Comment: occasional edible     Available pre-op labs reviewed.  Lab Results   Component Value Date    WBC 6.2 12/27/2024    RBC 4.51 12/27/2024    HGB 13.3 12/27/2024    HCT 41.6 12/27/2024    MCV 92.2 12/27/2024    MCH 29.5 12/27/2024    MCHC 32.0 12/27/2024    RDW 11.9 12/27/2024    .0 12/27/2024     Lab Results   Component Value Date     12/27/2024    K 4.2 12/27/2024     12/27/2024    CO2 26.0 12/27/2024    BUN 19 12/27/2024    CREATSERUM 0.88 12/27/2024    GLU 94 12/27/2024    CA 9.9 12/27/2024            Airway      Mallampati: II  Mouth opening: 3 FB  TM distance: 4 - 6 cm  Neck ROM: full Cardiovascular    Cardiovascular exam normal.         Dental    Dentition appears grossly intact         Pulmonary    Pulmonary exam normal.                 Other  findings              ASA: 3   Plan: general  NPO status verified and patient meets guidelines.    Post-procedure pain management plan discussed with surgeon and patient.    Comment: Discussed risks and benefits of GA including sore throat, allergy, nausea, vomiting, dental trauma, pain management modalities, transfusion if needed, etc. Questions answered and options explained. Patient accepts and wishes to proceed. The consent was signed without further questions.    Plan/risks discussed with: patient                Present on Admission:  **None**             [1]   Medications Prior to Admission   Medication Sig Dispense Refill Last Dose/Taking    bumetanide 0.5 MG Oral Tab Take 1 tablet (0.5 mg total) by mouth daily. 90 tablet 3 Taking    LEVOTHYROXINE 112 MCG Oral Tab TAKE 1 TABLET BY MOUTH EVERY  MORNING 90 tablet 3 Taking    Phentermine HCl 37.5 MG Oral Tab Take 1 tablet (37.5 mg total) by mouth daily.   Taking    topiramate 25 MG Oral Tab TAKE 1 TABLET EVERY DAY BY ORAL ROUTE IN THE MORNING.   Taking    Coenzyme Q10 (COQ-10) 200 MG Oral Cap    Taking    Multiple Vitamins-Minerals (MULTIVITAL) Oral Tab Take 1 tablet by mouth daily.   Taking    Cholecalciferol (VITAMIN D) 2000 UNITS Oral Tab Take 2,000 mg by mouth daily.     Taking    montelukast 10 MG Oral Tab TAKE 1 TABLET BY MOUTH DAILY 90 tablet 1     Meloxicam 15 MG Oral Tab Take 1 tablet (15 mg total) by mouth daily as needed for Pain. 90 tablet 1     ESCITALOPRAM 10 MG Oral Tab TAKE 1 TABLET BY MOUTH DAILY 90 tablet 3

## 2025-01-14 NOTE — ANESTHESIA POSTPROCEDURE EVALUATION
[facility]    Tona Stephenson Patient Status:  Hospital Outpatient Surgery   Age/Gender 59 year old female MRN [id number]   Location [facility] SURGERY Attending Rosa Sheldon MD   Hosp Day # 0 PCP Lionel Mares MD       Anesthesia Post-op Note    Dilation, TRUCLEAR HYSTEROSCOPY, hysteroscopic endometrial sampling and curettage, polypectomy    Procedure Summary       Date: 01/14/25 Room / Location:  MAIN OR 12 / [facility] MAIN OR    Anesthesia Start: 0956 Anesthesia Stop: 1043    Procedure: Dilation, TRUCLEAR HYSTEROSCOPY, hysteroscopic endometrial sampling and curettage, polypectomy (Vagina ) Diagnosis:       Endometrial polyp      (Endometrial polyp [N84.0])    Surgeons: Rosa Sheldon MD Anesthesiologist: Myerson, David, MD    Anesthesia Type: general ASA Status: 3            Anesthesia Type: general    Vitals Value Taken Time   /74 01/14/25 1043   Temp 97.8 °F (36.6 °C) 01/14/25 1043   Pulse 87 01/14/25 1043   Resp 16 01/14/25 1043   SpO2 94 % 01/14/25 1043           Patient Location: PACU    Anesthesia Type: general    Airway Patency: patent and extubated    Postop Pain Control: adequate    Mental Status: preanesthetic baseline    Nausea/Vomiting: none    Cardiopulmonary/Hydration status: stable euvolemic    Complications: no apparent anesthesia related complications    Postop vital signs: stable    Dental Exam: Unchanged from Preop    Patient to be discharged from PACU when criteria met.

## 2025-01-14 NOTE — DISCHARGE INSTRUCTIONS
Home Care Instructions Following Your Hysteroscopy     CLIFTON -  We hope you were pleased with your care at [facility].  We wish you the best outcome and overall experience with your operation.  These instructions will help to minimize pain, limit the risk for infection, and improve the likelihood of a successful result.    What to Expect:  Expect some slight vaginal bleeding for 1-2 weeks after your procedure.  Use pads only, No tampons  Contact the office if you are experiencing heavy bleeding >1 pad/hour (completely filled) for 2 consecutive hours OR are feeling symptomatic (listed below)    Bathing/Showers  You may resume showers in 1 day  No baths, swimming, or hot tubs for 2 weeks    Pain Medication   You may take Tylenol or Ibuprofen.  You may take ibuprofen after 4:30pm.  Recommend to alternate between Tylenol and Ibuprofen: 600 mg of ibuprofen by mouth every 6 hours as needed for pain along with Tylenol 500 mg to 1000 mg by mouth every 6 hours as needed for pain. Therefore, you will be able to take oral pain medication every 3 hours as needed for pain.  Drink a full glass of water with the medication    Home Medication  Resume your home medications as instructed    Diet  Resume your normal diet    Activity  No strenuous activity or heavy lifting for 2 weeks  You may go up and down the stairs as tolerated  No physical exercise, sports, or gym workouts for 2 weeks  No sexual activity for 2 weeks    Return to Work or School  Contact your gynecologist's office if you need a medical note    Driving  Do not drive if you are taking prescribed pain medication    Follow-up Appointment with Your Gynecologist  Call your gynecologist's office as soon as possible to schedule a follow up appointment in two weeks    Verify your appointment date, day, time, and location  At your postoperative appointment, your wounds will be evaluated, healing assessed, and any additional concerns and instructions will be  discussed    Questions or Concerns  Call the office if you experience severe pain not controlled by pain medication, swelling, heavy bleeding, foul smelling vaginal discharge, shortness of breath, chest pain, leg pain, fever (100.4 or greater), or other concerns  The number is: [phone]  If your call is made after office hours, the physician on call will be available to help you.  There is always a doctor from the group covering our gynecology patients, when your provider is unavailable      Thank you for coming to [facility] for your operation.  The nurses and the anesthesiologist try very hard to make sure you receive the best care possible.  Your trust in them as well as us is greatly appreciated.    Thanks so much,  Dr. Rosa Sheldon  [facility] [place] [facility]

## 2025-01-14 NOTE — H&P
[facility]  Obstetrics and Gynecology  History & Physical    Tona Stephenson Patient Status:  Hospital Outpatient Surgery    1965 MRN [id number]   Location [facility] PRE OP HOLDING Attending Rosa Sheldon MD   Hospital Day 0 PCP Lionel Mares MD     CC: Patient is here for scheduled procedure     SUBJECTIVE:    Tona Stephenson is a 59 year old  female who presents for scheduled hysteroscopy, D&C with trueclear for removal of endometral polyp.     The patient was seen in office with complaint of abnormal uterine bleeding after tamoxifen usage. Endometrial sampling was suggestive of endometrial polyp. A discussion was held with the patient regarding findings and recommendations. The patient was offered surgical management. The patient was agreeable with recommendations and presents today for scheduled procedure.     Obstetric History:   OB History    Para Term  AB Living   1 1 1     1   SAB IAB Ectopic Multiple Live Births           1      # Outcome Date GA Lbr Mike/2nd Weight Sex Type Anes PTL Lv   1 Term 10/22/03 37w0d  5 lb 14.4 oz (2.676 kg) M NORMAL SPONT Spinal  GINO     Past Medical History:   Past Medical History:    Allergic rhinitis    Arthritis    Back problem    Breast CA (HCC)    DCIS    Cancer (HCC)    right breast    Depression    Disorder of thyroid    Ductal carcinoma in situ of breast    Exposure to medical diagnostic radiation    last dose     Graves disease    Hyperthyroidism    Migraines    Obesity    Osteoarthritis    Visual impairment    glasses     Past Surgical History:   Past Surgical History:   Procedure Laterality Date    Cdh lapband program      Colonoscopy      D & c      Endometrial biopsy - jar(s): 2  2024    Lumpectomy right  2019    DCIS    Needle biopsy right  2019    US guided bx -2 sites - both DCIS      2003    Other accessory Left 2022    thumb surgery, for torn ligaments    Other surgical history  2019     Right partial mastectomy with wire localization    Other surgical history  07/12/2019    Right breast re-excision of margins    Other surgical history Left 2022    thumb tendon needed to be reattached    Radiation right  06/2019    Spine surgery procedure unlisted  1994,1998     Family History:   Family History   Problem Relation Age of Onset    Hypertension Father     Lipids Father     Obesity Father     Diabetes Father     Cancer Father         lung    Other (cad) Father         stents    Anemia Father     Stroke Father     Other (copd) Mother         pulmonary fibrosis    Stroke Mother     Stroke Other     Breast Cancer Self 51    DCIS Self     Depression Son      Social History:   Social History     Tobacco Use    Smoking status: Never    Smokeless tobacco: Never   Substance Use Topics    Alcohol use: Yes     Alcohol/week: 2.0 standard drinks of alcohol     Types: 2 Glasses of wine per week     Comment: Socially       Home Meds: Prescriptions Prior to Admission[1]  Allergies: Allergies[2]    OBJECTIVE:    Temp:  [97.8 °F (36.6 °C)] 97.8 °F (36.6 °C)  Pulse:  [83] 83  Resp:  [16] 16  BP: (138)/(86) 138/86  SpO2:  [97 %] 97 %  Body mass index is 43.75 kg/m².    General: AAO. NAD.   Lungs: CTAB.   CV: RRR.  Abdomen: soft, nontender, nondistended, +BS  Gu: deferred to OR  Extremities: negative edema bilaterally, negative calf tenderness bilaterally, Izabela's sign negative bilaterally     Imaging:  PROCEDURE:  US PELVIS (TRANSABDOMINAL AND TRANSVAGINAL) (CPT=76856/23890)     COMPARISON:  None.     INDICATIONS:  N93.9 Abnormal uterine bleeding     TECHNIQUE:  Pelvic ultrasound using transabdominal and endovaginal technique.  Transvaginal ultrasound was used to better evaluate adnexal and endometrial detail.     PATIENT STATED HISTORY: (As transcribed by Technologist)  Patient presents with abnormal uterine bleeding. Patient last period was 2020.         FINDINGS:                TRANSABDOMINAL ULTRASOUND:  UTERUS: The  uterus measures 8.7 x 4.5 x 5.5 cm. The endometrium appears thickened on transabdominal images.  Nabothian cyst.       No significant free pelvic fluid.     TRANSVAGINAL ULTRASOUND:  UTERUS:. Endometrial stripe measures:  19 mm in diameter     OVARIES:  RIGHT OVARY: Right ovary measures 2.7 x 1.7 x 1.7 cm.     LEFT OVARY: Left ovary is not visualized secondary to obscuration due to bowel gas.     VASCULARITY: Normal flow is documented with color Doppler imaging within the right ovary.                      Impression   CONCLUSION:    1. Endometrial stripe thickening measuring 19 mm in diameter with a heterogeneous appearance.  This is abnormal given patient's postmenopausal state.  2. The left ovary is not visualized secondary to obscuration due to bowel gas.        LOCATION:  [id number]          ASSESSMENT/ PLAN:    Tona Stephenson is a 59 year old  female who presents for scheduled hysteroscopy, D&C with trueclear for removal of endometral polyp.     Problem List Items Addressed This Visit    None      - Admit for outpatient surgical procedure   - IVF: LR @ 100 cc/hr   - Diet: NPO  - Meds: none  - VTE prophylaxis: SCDs, per protocol   - Anesthesia to evaluate   - Consent obtained and placed in chart     Future Appointments   Date Time Provider Department Center   2025 11:45 AM Rosa Sheldon MD [facility] [address]          Risks, benefits, alternatives and possible complications have been discussed in detail with the patient.  Pre-admission, admission, and post admission procedures and expectations were discussed in detail.  All questions answered, all appropriate consents will be signed at the Hospital. Previously stated procedure is planned for today and anticipated    Rosa Sheldon MD   [facility] - OBGYN      Note to patient and family   The 21st Century Cures Act makes medical notes available to patients in the interest of transparency.  However, please be advised that this is a medical document.  It  is intended as mmfx-gh-rekg communication.  It is written and medical language may contain abbreviations or verbiage that are technical and unfamiliar.  It may appear blunt or direct.  Medical documents are intended to carry relevant information, facts as evident, and the clinical opinion of the practitioner.                 [1]   Medications Prior to Admission   Medication Sig Dispense Refill Last Dose/Taking    montelukast 10 MG Oral Tab TAKE 1 TABLET BY MOUTH DAILY 90 tablet 1 1/13/2025    Meloxicam 15 MG Oral Tab Take 1 tablet (15 mg total) by mouth daily as needed for Pain. 90 tablet 1 Past Month    ESCITALOPRAM 10 MG Oral Tab TAKE 1 TABLET BY MOUTH DAILY 90 tablet 3 1/13/2025    bumetanide 0.5 MG Oral Tab Take 1 tablet (0.5 mg total) by mouth daily. 90 tablet 3 1/13/2025    LEVOTHYROXINE 112 MCG Oral Tab TAKE 1 TABLET BY MOUTH EVERY  MORNING 90 tablet 3 1/13/2025    Phentermine HCl 37.5 MG Oral Tab Take 1 tablet (37.5 mg total) by mouth daily.   Past Month    topiramate 25 MG Oral Tab TAKE 1 TABLET EVERY DAY BY ORAL ROUTE IN THE MORNING.   Past Month    Coenzyme Q10 (COQ-10) 200 MG Oral Cap    1/13/2025    Multiple Vitamins-Minerals (MULTIVITAL) Oral Tab Take 1 tablet by mouth daily.   Past Month    Cholecalciferol (VITAMIN D) 2000 UNITS Oral Tab Take 2,000 mg by mouth daily.     1/13/2025   [2]   Allergies  Allergen Reactions    Bermuda Grass OTHER (SEE COMMENTS)     Rhinitis    Sulfites OTHER (SEE COMMENTS)     Swollen lips and uvula, sulfa drugs ok.

## 2025-01-15 ENCOUNTER — PATIENT MESSAGE (OUTPATIENT)
Dept: OBGYN CLINIC | Facility: CLINIC | Age: 60
End: 2025-01-15

## 2025-01-16 ENCOUNTER — TELEPHONE (OUTPATIENT)
Dept: OBGYN CLINIC | Facility: CLINIC | Age: 60
End: 2025-01-16

## 2025-01-16 NOTE — TELEPHONE ENCOUNTER
Patient would like a call regarding results from pathology from her surgery with Dr. Sheldon. Please call cell phone #

## 2025-01-22 NOTE — TELEPHONE ENCOUNTER
See Result Note encounter.  Patient has already been notified of results for all specimens collected.  Written by Rosa Sheldon MD on 1/16/2025  6:10 PM CST  Seen by patient Tona Stephenson on 1/16/2025  9:58 PM

## 2025-01-27 ENCOUNTER — OFFICE VISIT (OUTPATIENT)
Dept: OBGYN CLINIC | Facility: CLINIC | Age: 60
End: 2025-01-27
Payer: COMMERCIAL

## 2025-01-27 VITALS
BODY MASS INDEX: 43.41 KG/M2 | DIASTOLIC BLOOD PRESSURE: 70 MMHG | SYSTOLIC BLOOD PRESSURE: 114 MMHG | WEIGHT: 245 LBS | HEIGHT: 63 IN

## 2025-01-27 DIAGNOSIS — Z09 POSTOPERATIVE EXAMINATION: Primary | ICD-10-CM

## 2025-01-27 NOTE — PROGRESS NOTES
[facility]  Obstetrics and Gynecology  Postoperative Appointment    CC: Postoperative appointment    Subjective:     HPI: Tona Stephenson is a 59 year old  female who is POD# 13 from Hysteroscopic endometrial polypectomy with TrueClear on 2024 for endometrial polyps after tamoxifen therapy.    Patient reports doing well with resolution of pelvic cramping and no vaginal bleeding. She is excited that her pathology came back benign.      OB History:  OB History    Para Term  AB Living   1 1 1 0 0 1   SAB IAB Ectopic Multiple Live Births   0 0 0 0 1       Gyne History:     No LMP recorded (lmp unknown). (Menstrual status: Menopause).    Pap smear history:  2024 - NILM (due in 3 years)   3/2023 - NILM HPV neg      Denies history of STDs (non-HPV).   Currently  sexually active   Birth control: postmenopausal      Meds:  Medications Ordered Prior to Encounter[1]    All:  Allergies[2]    PMH:  Past Medical History:    Allergic rhinitis    Arthritis    Back problem    Breast CA (HCC)    DCIS    Cancer (HCC)    right breast    Depression    Disorder of thyroid    Ductal carcinoma in situ of breast    Exposure to medical diagnostic radiation    last dose     Graves disease    Hyperthyroidism    Migraines    Obesity    Osteoarthritis    Visual impairment    glasses       Immunization History:   Immunization History   Administered Date(s) Administered    Covid-19 Vaccine Moderna 100 mcg/0.5 ml 2021, 2021    Influenza 2012, 10/19/2012, 10/09/2013, 2014, 09/15/2015, 10/11/2016, 10/29/2017, 10/28/2018, 10/29/2019, 10/05/2020, 10/25/2020, 2024    Influenza Virus Vaccine - Whole 2015, 10/30/2018    TDAP 2011, 2023   Deferred Date(s) Deferred    Afluria, 9 Years & >, IM 2015       PSH:  Past Surgical History:   Procedure Laterality Date    Cdh lapband program      Colonoscopy      D & c      Endometrial biopsy - jar(s): 2  2024     Lumpectomy right  2019    DCIS    Needle biopsy right  2019    US guided bx -2 sites - both DCIS      2003    Other accessory Left 2022    thumb surgery, for torn ligaments    Other surgical history  2019    Right partial mastectomy with wire localization    Other surgical history  2019    Right breast re-excision of margins    Other surgical history Left     thumb tendon needed to be reattached    Radiation right  2019    Spine surgery procedure unlisted         Social History:  Social History     Socioeconomic History    Marital status:      Spouse name: Not on file    Number of children: Not on file    Years of education: Not on file    Highest education level: Not on file   Occupational History    Not on file   Tobacco Use    Smoking status: Never    Smokeless tobacco: Never   Vaping Use    Vaping status: Never Used   Substance and Sexual Activity    Alcohol use: Yes     Alcohol/week: 2.0 standard drinks of alcohol     Types: 2 Glasses of wine per week     Comment: Socially    Drug use: Yes     Types: Cannabis     Comment: occasional edible    Sexual activity: Yes     Partners: Male   Other Topics Concern    Caffeine Concern No    Exercise No    Seat Belt Yes    Special Diet Yes     Comment: Lap band 2015    Stress Concern Yes     Comment: Family issues (caring for elder father & son with hx of depr    Weight Concern Yes     Comment: Had lap band 2015     Service Not Asked    Blood Transfusions Not Asked    Occupational Exposure Not Asked    Hobby Hazards Not Asked    Sleep Concern Not Asked    Back Care Not Asked    Bike Helmet Not Asked    Self-Exams Not Asked   Social History Narrative    Not on file     Social Drivers of Health     Financial Resource Strain: Not on file   Food Insecurity: Not on file   Transportation Needs: Not on file   Physical Activity: Not on file   Stress: Not on file   Social Connections: Not on file   Housing Stability: Not  on file         Patient feels unsafe or threatened?: NO    Abuse: None (physical, sexual or mental)    Family History:  Family History   Problem Relation Age of Onset    Hypertension Father     Lipids Father     Obesity Father     Diabetes Father     Cancer Father         lung    Other (cad) Father         stents    Anemia Father     Stroke Father     Other (copd) Mother         pulmonary fibrosis    Stroke Mother     Stroke Other     Breast Cancer Self 53    DCIS Self     Depression Son        Review of Systems:  General: no complaints per category. See HPI for additional information.   Breast: no complaints per category. See HPI for additional information.   Respiratory: no complaints per category. See HPI for additional information.   Cardiovascular: no complaints per category. See HPI for additional information.   GI: no complaints per category. See HPI for additional information.   : no complaints per category. See HPI for additional information.   Heme: no complaints per category. See HPI for additional information.       Objective:     Vitals:    01/27/25 1151   BP: 114/70   Weight: 245 lb (111.1 kg)   Height: 63\"         Body mass index is 43.4 kg/m².    General: AAO.NAD.   CV: regular rate and rhythm, +S1, +S2, no murmurs/rubs/gallops  Pulm: clear to auscultation bilaterally   Chest: non-labored breathing, no tachypnea   Breast: deferred  Abdominal exam: soft, nontender, nondistended  Pelvic exam: deferred   Ext: non-tender, no edema, normal peripheral perfusion    Specimen to Pathology Tissue: [id number]  Order: [id number]   Collected 1/14/2025 10:31 AM       Status: Final result       Visible to patient: Yes (seen)       Dx: Endometrial polyp    1 Result Note       1 Patient Communication        Component  Ref Range & Units      Case Report     Surgical Pathology                                Case: [id number]                                     Authorizing Provider:  Rosa Sheldon MD          Collected:            2025 10:31 AM             Ordering Location:     [facility]    Received:            2025 03:57 PM             Pathologist:           Shu Mario                                                               Specimen:    Endometrium, endometrial and endocervical polyps                                              Final Diagnosis:     A. Endometrial and endocervical polyps, curettage:  -Fragments of benign endometrial polyp.  -Fragments of benign endocervical polyp        Electronically signed by Shu Mario on 1/15/2025 at  2:41 PM        Final Diagnosis Comment      A. Multiple deeper levels were examined and show similar histologic findings.         Clinical Information      N84.0 Endometrial Polyp.        Gross Description      Received in formalin labeled with the patient's name and \" endometrial and endocervical polyps\". It consists of a 3.0 x 2.4 x 0.4 cm aggregate of soft tan-pink, rubbery tissue admixed with mucus and blood clots, received in a surgical sock. The specimen is submitted entirely in A1.      (TB)     Interpretation     Benign            Assessment:     Tona Stephenson is a 59 year old  female who is POD# 13 from Hysteroscopic endometrial polypectomy with TrueClear on 2024 for endometrial polyps after tamoxifen therapy.    Plan:     Problem List Items Addressed This Visit    None  Visit Diagnoses       Postoperative examination    -  Primary            Postoperative Appointment  - Patient has met post-surgical milestones as expected.   - doing well,  no complaints   - no abnormal findings on physical exam   - Completed postoperative restrictions.     All of the findings and plan were discussed with the patient.  She notes understanding and agrees with the plan of care.  All questions were answered to the best of my ability at this time.      RTC September to 2025 for annual exam     Rosa Sheldon MD   [facility] - OBGYN      Note to  patient and family   The 21st Century Cures Act makes medical notes available to patients in the interest of transparency.  However, please be advised that this is a medical document.  It is intended as pjuc-dp-htts communication.  It is written and medical language may contain abbreviations or verbiage that are technical and unfamiliar.  It may appear blunt or direct.  Medical documents are intended to carry relevant information, facts as evident, and the clinical opinion of the practitioner.           This note could include assistance by Dragon voice recognition. Errors in content may be related to improper recognition by the system; efforts to review and correct have been done but errors may still exist.           [1]   Current Outpatient Medications on File Prior to Visit   Medication Sig Dispense Refill    montelukast 10 MG Oral Tab TAKE 1 TABLET BY MOUTH DAILY 90 tablet 1    ESCITALOPRAM 10 MG Oral Tab TAKE 1 TABLET BY MOUTH DAILY 90 tablet 3    bumetanide 0.5 MG Oral Tab Take 1 tablet (0.5 mg total) by mouth daily. 90 tablet 3    LEVOTHYROXINE 112 MCG Oral Tab TAKE 1 TABLET BY MOUTH EVERY  MORNING 90 tablet 3    Phentermine HCl 37.5 MG Oral Tab Take 1 tablet (37.5 mg total) by mouth daily.      topiramate 25 MG Oral Tab TAKE 1 TABLET EVERY DAY BY ORAL ROUTE IN THE MORNING.      Coenzyme Q10 (COQ-10) 200 MG Oral Cap       Multiple Vitamins-Minerals (MULTIVITAL) Oral Tab Take 1 tablet by mouth daily.       No current facility-administered medications on file prior to visit.   [2]   Allergies  Allergen Reactions    Bermuda Grass OTHER (SEE COMMENTS)     Rhinitis    Sulfites OTHER (SEE COMMENTS)     Swollen lips and uvula, sulfa drugs ok.

## 2025-03-28 ENCOUNTER — PATIENT MESSAGE (OUTPATIENT)
Dept: FAMILY MEDICINE CLINIC | Facility: CLINIC | Age: 60
End: 2025-03-28

## 2025-03-28 DIAGNOSIS — M22.2X1 PATELLOFEMORAL ARTHRALGIA OF BOTH KNEES: ICD-10-CM

## 2025-03-28 DIAGNOSIS — M25.561 CHRONIC PAIN OF RIGHT KNEE: ICD-10-CM

## 2025-03-28 DIAGNOSIS — M17.11 OSTEOARTHRITIS OF RIGHT KNEE, UNSPECIFIED OSTEOARTHRITIS TYPE: ICD-10-CM

## 2025-03-28 DIAGNOSIS — G89.29 CHRONIC PAIN OF RIGHT KNEE: ICD-10-CM

## 2025-03-28 DIAGNOSIS — M22.2X2 PATELLOFEMORAL ARTHRALGIA OF BOTH KNEES: ICD-10-CM

## 2025-03-28 RX ORDER — MELOXICAM 15 MG/1
15 TABLET ORAL DAILY PRN
Qty: 90 TABLET | Refills: 1 | Status: SHIPPED | OUTPATIENT
Start: 2025-03-28

## 2025-06-04 ENCOUNTER — HOSPITAL ENCOUNTER (OUTPATIENT)
Dept: MAMMOGRAPHY | Facility: HOSPITAL | Age: 60
Discharge: HOME | End: 2025-06-04
Attending: INTERNAL MEDICINE
Payer: COMMERCIAL

## 2025-06-04 DIAGNOSIS — D05.11 DUCTAL CARCINOMA IN SITU (DCIS) OF RIGHT BREAST: ICD-10-CM

## 2025-06-04 PROCEDURE — 77066 DX MAMMO INCL CAD BI: CPT | Performed by: INTERNAL MEDICINE

## 2025-06-04 PROCEDURE — 77062 BREAST TOMOSYNTHESIS BI: CPT | Performed by: INTERNAL MEDICINE

## 2025-07-10 DIAGNOSIS — M25.561 CHRONIC PAIN OF RIGHT KNEE: ICD-10-CM

## 2025-07-10 DIAGNOSIS — G89.29 CHRONIC PAIN OF RIGHT KNEE: ICD-10-CM

## 2025-07-10 DIAGNOSIS — M22.2X2 PATELLOFEMORAL ARTHRALGIA OF BOTH KNEES: ICD-10-CM

## 2025-07-10 DIAGNOSIS — M17.11 OSTEOARTHRITIS OF RIGHT KNEE, UNSPECIFIED OSTEOARTHRITIS TYPE: ICD-10-CM

## 2025-07-10 DIAGNOSIS — M22.2X1 PATELLOFEMORAL ARTHRALGIA OF BOTH KNEES: ICD-10-CM

## 2025-07-10 DIAGNOSIS — E89.0 POSTABLATIVE HYPOTHYROIDISM: ICD-10-CM

## 2025-07-10 DIAGNOSIS — F41.1 GAD (GENERALIZED ANXIETY DISORDER): ICD-10-CM

## 2025-07-11 RX ORDER — MONTELUKAST SODIUM 10 MG/1
10 TABLET ORAL DAILY
Qty: 90 TABLET | Refills: 3 | OUTPATIENT
Start: 2025-07-11

## 2025-07-15 RX ORDER — LEVOTHYROXINE SODIUM 112 UG/1
112 TABLET ORAL EVERY MORNING
Qty: 90 TABLET | Refills: 3 | Status: SHIPPED | OUTPATIENT
Start: 2025-07-15

## 2025-07-15 RX ORDER — BUMETANIDE 0.5 MG/1
0.5 TABLET ORAL DAILY
Qty: 90 TABLET | Refills: 3 | Status: SHIPPED | OUTPATIENT
Start: 2025-07-15

## 2025-07-17 RX ORDER — MELOXICAM 15 MG/1
15 TABLET ORAL DAILY PRN
Qty: 90 TABLET | Refills: 1 | Status: SHIPPED | OUTPATIENT
Start: 2025-07-17

## 2025-07-17 RX ORDER — MONTELUKAST SODIUM 10 MG/1
10 TABLET ORAL DAILY
Qty: 90 TABLET | Refills: 3 | Status: SHIPPED | OUTPATIENT
Start: 2025-07-17

## 2025-07-17 RX ORDER — ESCITALOPRAM OXALATE 10 MG/1
10 TABLET ORAL DAILY
Qty: 90 TABLET | Refills: 3 | Status: SHIPPED | OUTPATIENT
Start: 2025-07-17

## (undated) DIAGNOSIS — G89.29 CHRONIC PAIN OF RIGHT KNEE: Primary | ICD-10-CM

## (undated) DIAGNOSIS — M17.11 PRIMARY OSTEOARTHRITIS OF RIGHT KNEE: ICD-10-CM

## (undated) DIAGNOSIS — D05.11 DUCTAL CARCINOMA IN SITU (DCIS) OF RIGHT BREAST: ICD-10-CM

## (undated) DIAGNOSIS — M25.561 CHRONIC PAIN OF RIGHT KNEE: Primary | ICD-10-CM

## (undated) DEVICE — STOCK SUR W9XL48IN IMPERV

## (undated) DEVICE — SOLUTION  .9 1000ML BTL

## (undated) DEVICE — 2000CC GUARDIAN II: Brand: GUARDIAN

## (undated) DEVICE — OCCLUSIVE GAUZE STRIP OVERWRAP,3% BISMUTH TRIBROMOPHENATE IN PETROLATUM BLEND: Brand: XEROFORM

## (undated) DEVICE — SOL  .9 1000ML BTL

## (undated) DEVICE — UPPER EXTREMITY CDS-LF: Brand: MEDLINE INDUSTRIES, INC.

## (undated) DEVICE — STERILE POLYISOPRENE POWDER-FREE SURGICAL GLOVES: Brand: PROTEXIS

## (undated) DEVICE — MEDI-VAC NON-CONDUCTIVE SUCTION TUBING: Brand: CARDINAL HEALTH

## (undated) DEVICE — PACK GYNE CUSTOM

## (undated) DEVICE — SOFT TISSUE SHAVER MINI: Brand: TRUCLEAR

## (undated) DEVICE — GLOVE SUR 6.5 SENSICARE PI PIP GRN PWD F

## (undated) DEVICE — SUT ETHILON 5-0 PS-3 1668H

## (undated) DEVICE — GLOVE SURG NEOLON SZ 7

## (undated) DEVICE — SUTURE SILK 2-0 SH

## (undated) DEVICE — SLEEVE KENDALL SCD EXPRESS MED

## (undated) DEVICE — UNDYED BRAIDED (POLYGLACTIN 910), SYNTHETIC ABSORBABLE SUTURE: Brand: COATED VICRYL

## (undated) DEVICE — SUTURE PROLENE 2-0 CT-2

## (undated) DEVICE — PROXIMATE SKIN STAPLERS (35 WIDE) CONTAINS 35 STAINLESS STEEL STAPLES (FIXED HEAD): Brand: PROXIMATE

## (undated) DEVICE — STERILE SYNTHETIC POLYISOPRENE POWDER-FREE SURGICAL GLOVES WITH HYDROGEL COATING: Brand: PROTEXIS

## (undated) DEVICE — PADDING CAST COTTON  4

## (undated) DEVICE — SUTURE VICRYL 3-0 SH

## (undated) DEVICE — GLOVE SUR 6 SENSICARE PI PIP CRM PWD F

## (undated) DEVICE — SPECIMEN SOCK - STANDARD: Brand: MEDI-VAC

## (undated) DEVICE — PADDING CAST SOFT ROLL 2" STER

## (undated) DEVICE — HYSTEROSCOPIC OUTFLOW TUBE SET

## (undated) DEVICE — SUTURE MONOCRYL 4-0 P-3

## (undated) DEVICE — SUTURE PROLENE 2-0 SH

## (undated) DEVICE — NON-ADHERENT PAD PREPACK: Brand: TELFA

## (undated) DEVICE — GOWN,SIRUS,FABRIC-REINFORCED,X-LARGE: Brand: MEDLINE

## (undated) DEVICE — SUT VICRYL 3-0 SH J416H

## (undated) DEVICE — SOLUTION IRRIG 3000ML 0.9% NACL FLX CONT

## (undated) DEVICE — BLADE #24 STRL PERSONNA PLUS

## (undated) DEVICE — UNDYED MONOFILAMENT (POLYDIOXANONE), ABSORBABLE SURGICAL SUTURE: Brand: PDS

## (undated) DEVICE — ELITE HYSTEROSCOPE SEAL: Brand: TRUCLEAR

## (undated) DEVICE — SYRINGE 10ML LL TIP

## (undated) DEVICE — PROXIMATE RH ROTATING HEAD SKIN STAPLERS (35 WIDE) CONTAINS 35 STAINLESS STEEL STAPLES: Brand: PROXIMATE

## (undated) DEVICE — SUTURE ETHIBOND 0 CT-1

## (undated) DEVICE — BREAST-HERNIA-PORT CDS-LF: Brand: MEDLINE INDUSTRIES, INC.

## (undated) DEVICE — DRAPE HALF 40X58 DYNJP2410

## (undated) DEVICE — SUTURE ETHIBOND 0 SH

## (undated) DEVICE — HOOK LOCK LATEX FREE ELASTIC BANDAGE 2INX5YD

## (undated) DEVICE — STANDARD HYPODERMIC NEEDLE,POLYPROPYLENE HUB: Brand: MONOJECT

## (undated) DEVICE — KENDALL SCD EXPRESS SLEEVES, KNEE LENGTH, MEDIUM: Brand: KENDALL SCD

## (undated) DEVICE — 3M™ STERI-STRIP™ REINFORCED ADHESIVE SKIN CLOSURES, R1547, 1/2 IN X 4 IN (12 MM X 100 MM), 6 STRIPS/ENVELOPE: Brand: 3M™ STERI-STRIP™

## (undated) DEVICE — PREMIUM WET SKIN PREP TRAY: Brand: MEDLINE INDUSTRIES, INC.

## (undated) DEVICE — GOWN AERO CHROME XXL

## (undated) DEVICE — SET TB INFLO FOR TRUCLEAR SYS HYSTEROLUX

## (undated) DEVICE — CAUTERY NEEDLE 2IN INS E1465

## (undated) DEVICE — DISPOSABLE TOURNIQUET CUFF SINGLE BLADDER, DUAL PORT AND QUICK CONNECT CONNECTOR: Brand: COLOR CUFF

## (undated) DEVICE — SUTURE ETHIBOND EXCEL 2-0 SH

## (undated) DEVICE — SPNG GZ W4XL4IN COT 12 PLY TYP

## (undated) DEVICE — SOLUTION IRRIG 1000ML 0.9% NACL USP BTL

## (undated) DEVICE — SUT MONOCRYL 4-0 PS-2 Y496G

## (undated) DEVICE — SLEEVE COMPR MD KNEE LEN SGL USE KENDALL SCD

## (undated) NOTE — LETTER
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     Sincerely    Dr. Serafin Bergman

## (undated) NOTE — LETTER
OUTSIDE TESTING RESULT REQUEST     IMPORTANT: FOR YOUR IMMEDIATE ATTENTION  Please FAX all test results listed below to: [phone]     Testing already done on or about: asap     * * * * If testing is NOT complete, arrange with patient A.S.A.P. * * * *      Patient Name: Tona Stephenson  Surgery Date: 2025  Medical Record: [id number]  CSN: [id number]  : 1965 - A: 59 y     Sex: female  Surgeon(s):  Rosa Sheldon MD  Procedure: Dilation, TRUCLEAR HYSTEROSCOPY, hysteroscopic endometrial sampling and curettage  Anesthesia Type: MAC     Surgeon: Rosa Sheldon MD     The following Testing and Time Line are REQUIRED PER ANESTHESIA     CBC, Platelet; NO Differential within  30 days  Electrolytes within 21 days      Thank You,   Sent by:ESTEFANIA

## (undated) NOTE — LETTER
Re:  Tona Stephenson, :  1965      Dear Dr. Mares,    I am writing in regard to Tona Stephenson who is scheduled for dilation, hysteroscopy, hysteroscopic endometrial sampling and curettage on .  I would like her to have a pre-op appointment with you prior to her surgery.  You should be receiving the anesthesiologist recommended pre-op testing order from the pre-admission department, with a list of tests which she will require prior to surgery.      If you do not receive the order, please contact the pre-admissions department.      If you have any other questions or concerns, please feel free to contact me.    Thank you,      Rosa Sheldon MD

## (undated) NOTE — LETTER
11/14/19        [facility] [address]      Dear Kendra López,    [facility] records indicate that you have outstanding lab work and or testing that was ordered for you and has not yet been completed:  Orders Placed This Encount

## (undated) NOTE — LETTER
01/04/22    To whom it may concern    Re: Josianejennifer Velazco  11/24/1965    Please allow patient to continue to work from home remotely due to her cancer diagnosis and medication that she currently takes that affects her immune system which can now make her mo

## (undated) NOTE — LETTER
06/12/19        [address] [facility] [address]      Dear Colin Miranda,    [facility] records indicate that you have outstanding lab work and or testing that was ordered for you and has not yet been completed:  Orders Placed This Encount

## (undated) NOTE — LETTER
5/2/2024  Tona Stephenson  [address] [address]    We take each of our patient's health very seriously and the key to maintaining your health is an annual wellness physical.  Review of your medical records shows that it is time for your annual wellness exam.  Please contact my office at your earliest convenience to schedule this appointment at [phone]  Thank You    Lionel Mares MD

## (undated) NOTE — LETTER
6/9/2020              Luis Malady        [address]        [place] [facility] [place] [facility] [address]         Dear Alie Marrero records indicate that the tests ordered for you by Omaira Carmona MD  have not been done.   If you have, in fact, already completed t

## (undated) NOTE — MR AVS SNAPSHOT
[address] [facility] [address] [phone]               Thank you for choosing us for your health care visit with Bayron Colin MD.  We are glad to serve you and happy to provide you with this summary of your Today's Vital Signs     BP Pulse Height Weight BMI    100/70 mmHg 74 64\" 190 lb 32.60 kg/m2         Current Medications          This list is accurate as of: 2/8/17  9:57 AM.  Always use your most recent med list.                ergocalciferol 51993 units

## (undated) NOTE — LETTER
Tona Stephenson, :1965    CONSENT FOR PROCEDURE/SEDATION    1. I authorize the performance upon Tona Stephenson  the following: Endometrial biopsy procedure    2. I authorize Dr. Rosa Sheldon MD (and whomever is designated as the doctor’s assistant), to perform the above-mentioned procedures.    3. If any unforeseen conditions arise during this procedure calling for additional  procedures, operations, or medications (including anesthesia and blood transfusion), I further request and authorize the doctor to do whatever he/she deems advisable in my interest.    4. I consent to the taking and reproduction of any photographs in the course of this procedure for professional purposes.    5. I consent to the administration of such sedation as may be considered necessary or advisable by the physician responsible for this service, with the exception of ______________________________________________________    6. I have been informed by my doctor of the nature and purpose of this procedure sedation, possible alternative methods of treatment, risk involved and possible complications.    7. If I have a Do Not Resuscitate (DNR) order in place, the physician and I (or the individual authorized to consent on my behalf) will discuss and agree as to whether the Do Not Resuscitate (DNR) order will remain in effect or will be discontinued during the performance of the procedure and the applicable recovery period. If the Do Not Resuscitate (DNR) order is discontinued and is to be reinstated following the procedure/recovery period, the physician will determine when the applicable recovery period ends for purposes of reinstating the Do Not Resuscitate (DNR) order.    Signature of Patient:_______________________________________________    Signature of person authorized to consent for patient:  _______________________________________________________________    Relationship to patient:  ____________________________________________    Witness: _________________________________________ Date:___________     Physician Signature: _______________________________ Date:___________

## (undated) NOTE — LETTER
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       Sincerely,    Cecile Rice MD

## (undated) NOTE — LETTER
OUTSIDE TESTING RESULT REQUEST     IMPORTANT: FOR YOUR IMMEDIATE ATTENTION  Please FAX all test results listed below to: [phone]     Testing already done on or about: asap     * * * * If testing is NOT complete, arrange with patient A.S.A.P. * * * *      Patient Name: Tona Stephenson  Surgery Date: 2025  Medical Record: [id number]  CSN: [id number]  : 1965 - A: 59 y     Sex: female  Surgeon(s):  Rosa Sheldon MD  Procedure: Dilation, TRUCLEAR HYSTEROSCOPY, hysteroscopic endometrial sampling and curettage  Anesthesia Type: MAC     Surgeon: Rosa Sheldon MD     The following Testing and Time Line are REQUIRED PER ANESTHESIA     CBC, Platelet; NO Differential within  30 days  Electrolytes within 21 days      Thank You,   Sent by:ESTEFANIA